# Patient Record
Sex: MALE | Race: WHITE | NOT HISPANIC OR LATINO | Employment: FULL TIME | URBAN - METROPOLITAN AREA
[De-identification: names, ages, dates, MRNs, and addresses within clinical notes are randomized per-mention and may not be internally consistent; named-entity substitution may affect disease eponyms.]

---

## 2019-01-01 ENCOUNTER — OFFICE VISIT (OUTPATIENT)
Dept: NEUROSURGERY | Facility: CLINIC | Age: 59
End: 2019-01-01

## 2019-01-01 ENCOUNTER — DOCUMENTATION (OUTPATIENT)
Dept: ONCOLOGY | Facility: HOSPITAL | Age: 59
End: 2019-01-01

## 2019-01-01 ENCOUNTER — APPOINTMENT (OUTPATIENT)
Dept: LAB | Facility: HOSPITAL | Age: 59
End: 2019-01-01

## 2019-01-01 ENCOUNTER — HOSPITAL ENCOUNTER (OUTPATIENT)
Dept: RADIATION ONCOLOGY | Facility: HOSPITAL | Age: 59
Discharge: HOME | End: 2019-08-08

## 2019-01-01 ENCOUNTER — READMISSION MANAGEMENT (OUTPATIENT)
Dept: CALL CENTER | Facility: HOSPITAL | Age: 59
End: 2019-01-01

## 2019-01-01 ENCOUNTER — TELEPHONE (OUTPATIENT)
Dept: ONCOLOGY | Facility: CLINIC | Age: 59
End: 2019-01-01

## 2019-01-01 ENCOUNTER — DOCUMENTATION (OUTPATIENT)
Dept: ONCOLOGY | Facility: CLINIC | Age: 59
End: 2019-01-01

## 2019-01-01 ENCOUNTER — HOSPITAL ENCOUNTER (OUTPATIENT)
Dept: RADIATION ONCOLOGY | Facility: HOSPITAL | Age: 59
Discharge: HOME | End: 2019-08-13

## 2019-01-01 ENCOUNTER — HOSPITAL ENCOUNTER (OUTPATIENT)
Dept: RADIATION ONCOLOGY | Facility: HOSPITAL | Age: 59
Discharge: HOME | End: 2019-08-14

## 2019-01-01 ENCOUNTER — HOSPITAL ENCOUNTER (OUTPATIENT)
Dept: ONCOLOGY | Facility: HOSPITAL | Age: 59
Discharge: HOME | End: 2019-07-29

## 2019-01-01 ENCOUNTER — APPOINTMENT (OUTPATIENT)
Dept: GENERAL RADIOLOGY | Facility: HOSPITAL | Age: 59
End: 2019-01-01

## 2019-01-01 ENCOUNTER — HOSPITAL ENCOUNTER (OUTPATIENT)
Dept: RADIATION ONCOLOGY | Facility: HOSPITAL | Age: 59
Setting detail: RADIATION/ONCOLOGY SERIES
End: 2019-01-01

## 2019-01-01 ENCOUNTER — HOSPITAL ENCOUNTER (OUTPATIENT)
Dept: INTERVENTIONAL RADIOLOGY/VASCULAR | Facility: HOSPITAL | Age: 59
Discharge: HOME | End: 2019-08-01
Admitting: RADIOLOGY

## 2019-01-01 ENCOUNTER — APPOINTMENT (OUTPATIENT)
Dept: ONCOLOGY | Facility: HOSPITAL | Age: 59
End: 2019-01-01

## 2019-01-01 ENCOUNTER — HOSPITAL ENCOUNTER (OUTPATIENT)
Dept: RADIATION ONCOLOGY | Facility: HOSPITAL | Age: 59
Discharge: HOME | End: 2019-08-12

## 2019-01-01 ENCOUNTER — HOSPITAL ENCOUNTER (OUTPATIENT)
Dept: RADIATION ONCOLOGY | Facility: HOSPITAL | Age: 59
Discharge: HOME | End: 2019-08-16

## 2019-01-01 ENCOUNTER — DOCUMENTATION (OUTPATIENT)
Dept: RADIATION ONCOLOGY | Facility: HOSPITAL | Age: 59
End: 2019-01-01

## 2019-01-01 ENCOUNTER — TELEPHONE (OUTPATIENT)
Dept: RADIATION ONCOLOGY | Facility: HOSPITAL | Age: 59
End: 2019-01-01

## 2019-01-01 ENCOUNTER — HOSPITAL ENCOUNTER (OUTPATIENT)
Dept: RADIATION ONCOLOGY | Facility: HOSPITAL | Age: 59
Discharge: HOME | End: 2019-08-02

## 2019-01-01 ENCOUNTER — HOSPITAL ENCOUNTER (OUTPATIENT)
Dept: RADIATION ONCOLOGY | Facility: HOSPITAL | Age: 59
Discharge: HOME | End: 2019-07-31

## 2019-01-01 ENCOUNTER — HOSPITAL ENCOUNTER (OUTPATIENT)
Dept: MRI IMAGING | Facility: HOSPITAL | Age: 59
Discharge: HOME | End: 2019-08-30
Admitting: RADIOLOGY

## 2019-01-01 ENCOUNTER — APPOINTMENT (OUTPATIENT)
Dept: PET IMAGING | Facility: HOSPITAL | Age: 59
End: 2019-01-01

## 2019-01-01 ENCOUNTER — APPOINTMENT (OUTPATIENT)
Dept: ONCOLOGY | Facility: CLINIC | Age: 59
End: 2019-01-01

## 2019-01-01 ENCOUNTER — OFFICE VISIT (OUTPATIENT)
Dept: ONCOLOGY | Facility: CLINIC | Age: 59
End: 2019-01-01

## 2019-01-01 ENCOUNTER — HOSPITAL ENCOUNTER (OUTPATIENT)
Dept: RADIATION ONCOLOGY | Facility: HOSPITAL | Age: 59
Discharge: HOME | End: 2019-08-15

## 2019-01-01 ENCOUNTER — HOSPITAL ENCOUNTER (OUTPATIENT)
Dept: RADIATION ONCOLOGY | Facility: HOSPITAL | Age: 59
Discharge: HOME | End: 2019-07-29

## 2019-01-01 ENCOUNTER — APPOINTMENT (OUTPATIENT)
Dept: MRI IMAGING | Facility: HOSPITAL | Age: 59
End: 2019-01-01

## 2019-01-01 ENCOUNTER — HOSPITAL ENCOUNTER (OUTPATIENT)
Dept: RADIATION ONCOLOGY | Facility: HOSPITAL | Age: 59
Discharge: HOME | End: 2019-08-19

## 2019-01-01 ENCOUNTER — ANESTHESIA EVENT (OUTPATIENT)
Dept: PERIOP | Facility: HOSPITAL | Age: 59
End: 2019-01-01

## 2019-01-01 ENCOUNTER — HOSPITAL ENCOUNTER (OUTPATIENT)
Dept: RADIATION ONCOLOGY | Facility: HOSPITAL | Age: 59
Discharge: HOME | End: 2019-07-19

## 2019-01-01 ENCOUNTER — HOSPITAL ENCOUNTER (OUTPATIENT)
Dept: RADIATION ONCOLOGY | Facility: HOSPITAL | Age: 59
Discharge: HOME | End: 2019-08-01

## 2019-01-01 ENCOUNTER — APPOINTMENT (OUTPATIENT)
Dept: CT IMAGING | Facility: HOSPITAL | Age: 59
End: 2019-01-01

## 2019-01-01 ENCOUNTER — HOSPITAL ENCOUNTER (OUTPATIENT)
Dept: RADIATION ONCOLOGY | Facility: HOSPITAL | Age: 59
Discharge: HOME | End: 2019-08-06

## 2019-01-01 ENCOUNTER — HOSPITAL ENCOUNTER (OUTPATIENT)
Dept: RADIATION ONCOLOGY | Facility: HOSPITAL | Age: 59
Discharge: HOME | End: 2019-08-07

## 2019-01-01 ENCOUNTER — ANESTHESIA (OUTPATIENT)
Dept: PERIOP | Facility: HOSPITAL | Age: 59
End: 2019-01-01

## 2019-01-01 ENCOUNTER — HOSPITAL ENCOUNTER (OUTPATIENT)
Dept: RADIATION ONCOLOGY | Facility: HOSPITAL | Age: 59
Discharge: HOME | End: 2019-08-20

## 2019-01-01 ENCOUNTER — HOSPITAL ENCOUNTER (OUTPATIENT)
Dept: RADIATION ONCOLOGY | Facility: HOSPITAL | Age: 59
Discharge: HOME | End: 2019-07-30

## 2019-01-01 ENCOUNTER — HOSPITAL ENCOUNTER (OUTPATIENT)
Dept: ONCOLOGY | Facility: HOSPITAL | Age: 59
Setting detail: INFUSION SERIES
Discharge: HOME | End: 2019-08-09

## 2019-01-01 ENCOUNTER — CONSULT (OUTPATIENT)
Dept: RADIATION ONCOLOGY | Facility: HOSPITAL | Age: 59
End: 2019-01-01

## 2019-01-01 ENCOUNTER — TELEPHONE (OUTPATIENT)
Dept: NEUROSURGERY | Facility: CLINIC | Age: 59
End: 2019-01-01

## 2019-01-01 ENCOUNTER — HOSPITAL ENCOUNTER (OUTPATIENT)
Dept: RADIATION ONCOLOGY | Facility: HOSPITAL | Age: 59
Discharge: HOME | End: 2019-08-09

## 2019-01-01 ENCOUNTER — HOSPITAL ENCOUNTER (INPATIENT)
Facility: HOSPITAL | Age: 59
LOS: 5 days | Discharge: HOME | End: 2019-06-28
Attending: INTERNAL MEDICINE | Admitting: INTERNAL MEDICINE

## 2019-01-01 ENCOUNTER — HOSPITAL ENCOUNTER (EMERGENCY)
Facility: HOSPITAL | Age: 59
Discharge: ANOTHER HOSPITAL | End: 2019-06-23
Admitting: EMERGENCY MEDICINE

## 2019-01-01 ENCOUNTER — HOSPITAL ENCOUNTER (OUTPATIENT)
Dept: PET IMAGING | Facility: HOSPITAL | Age: 59
End: 2019-01-01

## 2019-01-01 ENCOUNTER — CONSULT (OUTPATIENT)
Dept: ONCOLOGY | Facility: CLINIC | Age: 59
End: 2019-01-01

## 2019-01-01 ENCOUNTER — HOSPITAL ENCOUNTER (OUTPATIENT)
Dept: PET IMAGING | Facility: HOSPITAL | Age: 59
Discharge: HOME | End: 2019-07-16
Admitting: INTERNAL MEDICINE

## 2019-01-01 VITALS
HEART RATE: 60 BPM | SYSTOLIC BLOOD PRESSURE: 139 MMHG | HEIGHT: 69 IN | OXYGEN SATURATION: 98 % | TEMPERATURE: 97.8 F | WEIGHT: 143.5 LBS | DIASTOLIC BLOOD PRESSURE: 74 MMHG | RESPIRATION RATE: 16 BRPM | BODY MASS INDEX: 21.25 KG/M2

## 2019-01-01 VITALS
TEMPERATURE: 97.8 F | BODY MASS INDEX: 20.88 KG/M2 | SYSTOLIC BLOOD PRESSURE: 123 MMHG | HEART RATE: 98 BPM | WEIGHT: 141 LBS | HEIGHT: 69 IN | RESPIRATION RATE: 20 BRPM | DIASTOLIC BLOOD PRESSURE: 72 MMHG

## 2019-01-01 VITALS
RESPIRATION RATE: 18 BRPM | BODY MASS INDEX: 21.37 KG/M2 | HEIGHT: 68 IN | DIASTOLIC BLOOD PRESSURE: 78 MMHG | WEIGHT: 141 LBS | HEART RATE: 128 BPM | TEMPERATURE: 98.2 F | SYSTOLIC BLOOD PRESSURE: 119 MMHG

## 2019-01-01 VITALS
HEIGHT: 69 IN | WEIGHT: 141.8 LBS | OXYGEN SATURATION: 99 % | RESPIRATION RATE: 18 BRPM | BODY MASS INDEX: 21 KG/M2 | SYSTOLIC BLOOD PRESSURE: 131 MMHG | TEMPERATURE: 98.3 F | HEART RATE: 67 BPM | DIASTOLIC BLOOD PRESSURE: 79 MMHG

## 2019-01-01 VITALS
WEIGHT: 141.6 LBS | HEART RATE: 75 BPM | RESPIRATION RATE: 18 BRPM | HEIGHT: 69 IN | BODY MASS INDEX: 20.97 KG/M2 | DIASTOLIC BLOOD PRESSURE: 71 MMHG | TEMPERATURE: 98.1 F | SYSTOLIC BLOOD PRESSURE: 131 MMHG

## 2019-01-01 VITALS
HEART RATE: 72 BPM | SYSTOLIC BLOOD PRESSURE: 133 MMHG | DIASTOLIC BLOOD PRESSURE: 71 MMHG | HEIGHT: 69 IN | WEIGHT: 141 LBS | BODY MASS INDEX: 20.88 KG/M2

## 2019-01-01 VITALS
OXYGEN SATURATION: 91 % | WEIGHT: 154.76 LBS | TEMPERATURE: 99.4 F | BODY MASS INDEX: 22.85 KG/M2 | SYSTOLIC BLOOD PRESSURE: 145 MMHG | HEART RATE: 96 BPM | DIASTOLIC BLOOD PRESSURE: 70 MMHG | RESPIRATION RATE: 18 BRPM

## 2019-01-01 VITALS
HEIGHT: 68 IN | OXYGEN SATURATION: 92 % | SYSTOLIC BLOOD PRESSURE: 143 MMHG | RESPIRATION RATE: 17 BRPM | DIASTOLIC BLOOD PRESSURE: 70 MMHG | TEMPERATURE: 98.2 F | BODY MASS INDEX: 21.58 KG/M2 | WEIGHT: 142.42 LBS | HEART RATE: 97 BPM

## 2019-01-01 VITALS
HEIGHT: 69 IN | RESPIRATION RATE: 16 BRPM | BODY MASS INDEX: 22.4 KG/M2 | SYSTOLIC BLOOD PRESSURE: 151 MMHG | DIASTOLIC BLOOD PRESSURE: 84 MMHG | HEART RATE: 103 BPM | TEMPERATURE: 98 F | WEIGHT: 151.24 LBS | OXYGEN SATURATION: 97 %

## 2019-01-01 VITALS — HEART RATE: 108 BPM | SYSTOLIC BLOOD PRESSURE: 120 MMHG | DIASTOLIC BLOOD PRESSURE: 71 MMHG

## 2019-01-01 DIAGNOSIS — Z09 SURGERY FOLLOW-UP EXAMINATION: Primary | ICD-10-CM

## 2019-01-01 DIAGNOSIS — T45.1X5A CHEMOTHERAPY-INDUCED NAUSEA: ICD-10-CM

## 2019-01-01 DIAGNOSIS — R41.0 CONFUSION: ICD-10-CM

## 2019-01-01 DIAGNOSIS — C79.31 METASTATIC CANCER TO BRAIN (HCC): Primary | ICD-10-CM

## 2019-01-01 DIAGNOSIS — C34.91 METASTATIC PRIMARY LUNG CANCER, RIGHT (HCC): ICD-10-CM

## 2019-01-01 DIAGNOSIS — K63.89 COLONIC MASS: ICD-10-CM

## 2019-01-01 DIAGNOSIS — C34.81 MALIGNANT NEOPLASM OF OVERLAPPING SITES OF RIGHT LUNG (HCC): ICD-10-CM

## 2019-01-01 DIAGNOSIS — G93.89 BRAIN MASS: Primary | ICD-10-CM

## 2019-01-01 DIAGNOSIS — C34.91 METASTATIC PRIMARY LUNG CANCER, RIGHT (HCC): Primary | ICD-10-CM

## 2019-01-01 DIAGNOSIS — G93.89 BRAIN MASS: ICD-10-CM

## 2019-01-01 DIAGNOSIS — G93.5 NEOPLASM OF BRAIN CAUSING MASS EFFECT ON ADJACENT STRUCTURES (HCC): Primary | ICD-10-CM

## 2019-01-01 DIAGNOSIS — K63.89 MASS OF COLON: ICD-10-CM

## 2019-01-01 DIAGNOSIS — Z12.11 SCREENING FOR COLON CANCER: ICD-10-CM

## 2019-01-01 DIAGNOSIS — C78.01 METASTATIC SQUAMOUS CELL CARCINOMA TO LUNG, RIGHT (HCC): Primary | ICD-10-CM

## 2019-01-01 DIAGNOSIS — C79.31 METASTATIC CANCER TO BRAIN (HCC): ICD-10-CM

## 2019-01-01 DIAGNOSIS — R11.0 CHEMOTHERAPY-INDUCED NAUSEA: ICD-10-CM

## 2019-01-01 DIAGNOSIS — D49.6 NEOPLASM OF BRAIN CAUSING MASS EFFECT ON ADJACENT STRUCTURES (HCC): Primary | ICD-10-CM

## 2019-01-01 DIAGNOSIS — Z09 SURGERY FOLLOW-UP EXAMINATION: ICD-10-CM

## 2019-01-01 DIAGNOSIS — L30.9 DERMATITIS: ICD-10-CM

## 2019-01-01 DIAGNOSIS — B37.0 ORAL THRUSH: ICD-10-CM

## 2019-01-01 DIAGNOSIS — G89.3 CANCER ASSOCIATED PAIN: ICD-10-CM

## 2019-01-01 DIAGNOSIS — C34.90 PRIMARY MALIGNANT NEOPLASM OF LUNG METASTATIC TO OTHER SITE, UNSPECIFIED LATERALITY (HCC): ICD-10-CM

## 2019-01-01 LAB
ALBUMIN SERPL-MCNC: 2.4 G/DL (ref 3.5–4.8)
ALBUMIN SERPL-MCNC: 2.8 G/DL (ref 3.5–4.8)
ALBUMIN SERPL-MCNC: 3.1 G/DL (ref 3.5–4.8)
ALBUMIN SERPL-MCNC: 3.2 G/DL (ref 3.5–5.2)
ALBUMIN SERPL-MCNC: 3.7 G/DL (ref 3.5–5.2)
ALBUMIN SERPL-MCNC: 3.8 G/DL (ref 3.5–5.2)
ALBUMIN SERPL-MCNC: NORMAL G/DL (ref 3.5–5.2)
ALBUMIN/GLOB SERPL: 0.7 G/DL (ref 1–1.7)
ALBUMIN/GLOB SERPL: 0.8 G/DL (ref 1–1.7)
ALBUMIN/GLOB SERPL: 0.9 G/DL (ref 1–1.7)
ALBUMIN/GLOB SERPL: 1.1 G/DL
ALBUMIN/GLOB SERPL: 1.2 G/DL
ALBUMIN/GLOB SERPL: 1.4 G/DL
ALBUMIN/GLOB SERPL: NORMAL G/DL
ALP SERPL-CCNC: 62 U/L (ref 32–91)
ALP SERPL-CCNC: 69 U/L (ref 39–117)
ALP SERPL-CCNC: 79 U/L (ref 32–91)
ALP SERPL-CCNC: 83 U/L (ref 39–117)
ALP SERPL-CCNC: 89 U/L (ref 32–91)
ALP SERPL-CCNC: 96 U/L (ref 39–117)
ALP SERPL-CCNC: NORMAL U/L (ref 39–117)
ALT SERPL W P-5'-P-CCNC: 10 U/L (ref 17–63)
ALT SERPL W P-5'-P-CCNC: 13 U/L (ref 1–41)
ALT SERPL W P-5'-P-CCNC: 16 U/L (ref 17–63)
ALT SERPL W P-5'-P-CCNC: 18 U/L (ref 17–63)
ALT SERPL W P-5'-P-CCNC: 7 U/L (ref 1–41)
ALT SERPL W P-5'-P-CCNC: 9 U/L (ref 1–41)
ALT SERPL W P-5'-P-CCNC: NORMAL U/L (ref 1–41)
ANION GAP SERPL CALCULATED.3IONS-SCNC: 12.9 MMOL/L
ANION GAP SERPL CALCULATED.3IONS-SCNC: 16 MMOL/L (ref 10–20)
ANION GAP SERPL CALCULATED.3IONS-SCNC: 18.8 MMOL/L (ref 5–15)
ANION GAP SERPL CALCULATED.3IONS-SCNC: 19.3 MMOL/L (ref 5–15)
ANION GAP SERPL CALCULATED.3IONS-SCNC: 9.2 MMOL/L
ANION GAP SERPL CALCULATED.3IONS-SCNC: 9.5 MMOL/L
ANION GAP SERPL CALCULATED.3IONS-SCNC: 9.7 MMOL/L (ref 5–15)
ANION GAP SERPL CALCULATED.3IONS-SCNC: NORMAL MMOL/L
ANISOCYTOSIS BLD QL: ABNORMAL
APTT PPP: 17.9 SECONDS (ref 24–31)
APTT PPP: 26.1 SECONDS (ref 22.7–35.4)
APTT PPP: 28.7 SECONDS (ref 22.7–35.4)
AST SERPL-CCNC: 11 U/L (ref 1–40)
AST SERPL-CCNC: 12 U/L (ref 1–40)
AST SERPL-CCNC: 14 U/L (ref 15–41)
AST SERPL-CCNC: 14 U/L (ref 1–40)
AST SERPL-CCNC: 15 U/L (ref 15–41)
AST SERPL-CCNC: 17 U/L (ref 15–41)
AST SERPL-CCNC: NORMAL U/L (ref 1–40)
BACTERIA SPEC AEROBE CULT: NORMAL
BACTERIA SPEC AEROBE CULT: NORMAL
BACTERIA SPEC ANAEROBE CULT: NORMAL
BASOPHILS # BLD AUTO: 0 10*3/MM3 (ref 0–0.2)
BASOPHILS # BLD AUTO: 0.02 10*3/MM3 (ref 0–0.2)
BASOPHILS # BLD AUTO: 0.03 10*3/MM3 (ref 0–0.2)
BASOPHILS # BLD AUTO: 0.03 10*3/MM3 (ref 0–0.2)
BASOPHILS # BLD AUTO: 0.06 10*3/MM3 (ref 0–0.2)
BASOPHILS # BLD AUTO: 0.06 10*3/MM3 (ref 0–0.2)
BASOPHILS # BLD AUTO: 0.1 10*3/MM3 (ref 0–0.2)
BASOPHILS NFR BLD AUTO: 0 % (ref 0–1.5)
BASOPHILS NFR BLD AUTO: 0.2 % (ref 0–1.5)
BASOPHILS NFR BLD AUTO: 0.2 % (ref 0–1.5)
BASOPHILS NFR BLD AUTO: 0.3 % (ref 0–1.5)
BASOPHILS NFR BLD AUTO: 0.4 % (ref 0–1.5)
BASOPHILS NFR BLD AUTO: 0.4 % (ref 0–1.5)
BASOPHILS NFR BLD AUTO: 1.3 % (ref 0–1.5)
BILIRUB SERPL-MCNC: 0.2 MG/DL (ref 0.2–1.2)
BILIRUB SERPL-MCNC: 0.2 MG/DL (ref 0.2–1.2)
BILIRUB SERPL-MCNC: 0.3 MG/DL (ref 0.3–1.2)
BILIRUB SERPL-MCNC: 0.6 MG/DL (ref 0.3–1.2)
BILIRUB SERPL-MCNC: 0.8 MG/DL (ref 0.3–1.2)
BILIRUB SERPL-MCNC: <0.2 MG/DL (ref 0.2–1.2)
BILIRUB SERPL-MCNC: NORMAL MG/DL (ref 0.2–1.2)
BUN BLD-MCNC: 12 MG/DL (ref 6–20)
BUN BLD-MCNC: 14 MG/DL (ref 8–20)
BUN BLD-MCNC: 16 MG/DL (ref 8–20)
BUN BLD-MCNC: 6 MG/DL (ref 8–20)
BUN BLD-MCNC: 7 MG/DL (ref 6–20)
BUN BLD-MCNC: NORMAL MG/DL (ref 6–20)
BUN/CREAT SERPL: 10.6 (ref 7–25)
BUN/CREAT SERPL: 19.7 (ref 7–25)
BUN/CREAT SERPL: 23.3 (ref 6.2–20.3)
BUN/CREAT SERPL: 26.7 (ref 6.2–20.3)
BUN/CREAT SERPL: 8.6 (ref 6.2–20.3)
BUN/CREAT SERPL: NORMAL (ref 7–25)
CALCIUM SPEC-SCNC: 8.3 MG/DL (ref 8.6–10.5)
CALCIUM SPEC-SCNC: 8.3 MG/DL (ref 8.9–10.3)
CALCIUM SPEC-SCNC: 8.8 MG/DL (ref 8.6–10.5)
CALCIUM SPEC-SCNC: 9.1 MG/DL (ref 8.6–10.5)
CALCIUM SPEC-SCNC: 9.1 MG/DL (ref 8.9–10.3)
CALCIUM SPEC-SCNC: 9.3 MG/DL (ref 8.9–10.3)
CALCIUM SPEC-SCNC: 9.5 MG/DL (ref 8.6–10.5)
CALCIUM SPEC-SCNC: NORMAL MG/DL (ref 8.6–10.5)
CHLORIDE SERPL-SCNC: 101 MMOL/L (ref 98–107)
CHLORIDE SERPL-SCNC: 90 MMOL/L (ref 101–111)
CHLORIDE SERPL-SCNC: 93 MMOL/L (ref 101–111)
CHLORIDE SERPL-SCNC: 94 MMOL/L (ref 101–111)
CHLORIDE SERPL-SCNC: 95 MMOL/L (ref 98–107)
CHLORIDE SERPL-SCNC: 96 MMOL/L (ref 98–107)
CHLORIDE SERPL-SCNC: 96 MMOL/L (ref 98–107)
CHLORIDE SERPL-SCNC: NORMAL MMOL/L (ref 98–107)
CO2 SERPL-SCNC: 23 MMOL/L (ref 22–32)
CO2 SERPL-SCNC: 25 MMOL/L (ref 22–32)
CO2 SERPL-SCNC: 27.1 MMOL/L (ref 22–29)
CO2 SERPL-SCNC: 27.8 MMOL/L (ref 22–29)
CO2 SERPL-SCNC: 28 MMOL/L (ref 22–32)
CO2 SERPL-SCNC: 31.3 MMOL/L (ref 22–29)
CO2 SERPL-SCNC: 31.5 MMOL/L (ref 22–29)
CO2 SERPL-SCNC: NORMAL MMOL/L (ref 22–29)
CREAT BLD-MCNC: 0.6 MG/DL (ref 0.7–1.2)
CREAT BLD-MCNC: 0.6 MG/DL (ref 0.7–1.2)
CREAT BLD-MCNC: 0.61 MG/DL (ref 0.76–1.27)
CREAT BLD-MCNC: 0.66 MG/DL (ref 0.76–1.27)
CREAT BLD-MCNC: 0.7 MG/DL (ref 0.7–1.2)
CREAT BLD-MCNC: NORMAL MG/DL (ref 0.76–1.27)
CREAT BLDA-MCNC: 0.4 MG/DL (ref 0.6–1.3)
CYTO UR: NORMAL
DEPRECATED RDW RBC AUTO: 42.7 FL (ref 37–54)
DEPRECATED RDW RBC AUTO: 43.6 FL (ref 37–54)
DEPRECATED RDW RBC AUTO: 43.9 FL (ref 37–54)
DEPRECATED RDW RBC AUTO: 44.2 FL (ref 37–54)
DEPRECATED RDW RBC AUTO: 44.6 FL (ref 37–54)
DEPRECATED RDW RBC AUTO: 45.5 FL (ref 37–54)
DEPRECATED RDW RBC AUTO: 45.6 FL (ref 37–54)
DEPRECATED RDW RBC AUTO: 48.7 FL (ref 37–54)
DEPRECATED RDW RBC AUTO: 49.7 FL (ref 37–54)
DEPRECATED RDW RBC AUTO: 50.8 FL (ref 37–54)
DEPRECATED RDW RBC AUTO: NORMAL FL (ref 37–54)
EOSINOPHIL # BLD AUTO: 0 10*3/MM3 (ref 0–0.4)
EOSINOPHIL # BLD AUTO: 0.03 10*3/MM3 (ref 0–0.4)
EOSINOPHIL # BLD AUTO: 0.04 10*3/MM3 (ref 0–0.4)
EOSINOPHIL # BLD AUTO: 0.1 10*3/MM3 (ref 0–0.4)
EOSINOPHIL # BLD AUTO: 0.5 10*3/MM3 (ref 0–0.4)
EOSINOPHIL NFR BLD AUTO: 0 % (ref 0.3–6.2)
EOSINOPHIL NFR BLD AUTO: 0.2 % (ref 0.3–6.2)
EOSINOPHIL NFR BLD AUTO: 0.5 % (ref 0.3–6.2)
EOSINOPHIL NFR BLD AUTO: 0.7 % (ref 0.3–6.2)
EOSINOPHIL NFR BLD AUTO: 4.6 % (ref 0.3–6.2)
ERYTHROCYTE [DISTWIDTH] IN BLOOD BY AUTOMATED COUNT: 13.3 % (ref 12.3–15.4)
ERYTHROCYTE [DISTWIDTH] IN BLOOD BY AUTOMATED COUNT: 13.7 % (ref 12.3–15.4)
ERYTHROCYTE [DISTWIDTH] IN BLOOD BY AUTOMATED COUNT: 13.7 % (ref 12.3–15.4)
ERYTHROCYTE [DISTWIDTH] IN BLOOD BY AUTOMATED COUNT: 13.8 % (ref 12.3–15.4)
ERYTHROCYTE [DISTWIDTH] IN BLOOD BY AUTOMATED COUNT: 13.9 % (ref 12.3–15.4)
ERYTHROCYTE [DISTWIDTH] IN BLOOD BY AUTOMATED COUNT: 14.4 % (ref 12.3–15.4)
ERYTHROCYTE [DISTWIDTH] IN BLOOD BY AUTOMATED COUNT: 14.6 % (ref 12.3–15.4)
ERYTHROCYTE [DISTWIDTH] IN BLOOD BY AUTOMATED COUNT: 15.5 % (ref 12.3–15.4)
ERYTHROCYTE [DISTWIDTH] IN BLOOD BY AUTOMATED COUNT: 15.6 % (ref 12.3–15.4)
ERYTHROCYTE [DISTWIDTH] IN BLOOD BY AUTOMATED COUNT: 16.6 % (ref 12.3–15.4)
ERYTHROCYTE [DISTWIDTH] IN BLOOD BY AUTOMATED COUNT: NORMAL % (ref 12.3–15.4)
GFR SERPL CREATININE-BSD FRML MDRD: 115 ML/MIN/1.73
GFR SERPL CREATININE-BSD FRML MDRD: 124 ML/MIN/1.73
GFR SERPL CREATININE-BSD FRML MDRD: 135 ML/MIN/1.73
GFR SERPL CREATININE-BSD FRML MDRD: 138 ML/MIN/1.73
GFR SERPL CREATININE-BSD FRML MDRD: 138 ML/MIN/1.73
GFR SERPL CREATININE-BSD FRML MDRD: NORMAL ML/MIN/1.73
GLOBULIN UR ELPH-MCNC: 2.6 GM/DL
GLOBULIN UR ELPH-MCNC: 2.8 GM/DL
GLOBULIN UR ELPH-MCNC: 3 GM/DL (ref 2.5–3.8)
GLOBULIN UR ELPH-MCNC: 3.3 GM/DL (ref 2.5–3.8)
GLOBULIN UR ELPH-MCNC: 3.4 GM/DL
GLOBULIN UR ELPH-MCNC: 3.8 GM/DL (ref 2.5–3.8)
GLOBULIN UR ELPH-MCNC: NORMAL GM/DL
GLUCOSE BLD-MCNC: 123 MG/DL (ref 65–99)
GLUCOSE BLD-MCNC: 137 MG/DL (ref 65–99)
GLUCOSE BLD-MCNC: 150 MG/DL (ref 65–99)
GLUCOSE BLD-MCNC: 159 MG/DL (ref 65–99)
GLUCOSE BLD-MCNC: 170 MG/DL (ref 65–99)
GLUCOSE BLD-MCNC: 181 MG/DL (ref 65–99)
GLUCOSE BLD-MCNC: 327 MG/DL (ref 65–99)
GLUCOSE BLD-MCNC: NORMAL MG/DL (ref 65–99)
GLUCOSE BLDC GLUCOMTR-MCNC: 119 MG/DL (ref 70–130)
GLUCOSE BLDC GLUCOMTR-MCNC: 128 MG/DL (ref 70–130)
GLUCOSE BLDC GLUCOMTR-MCNC: 130 MG/DL (ref 70–130)
GLUCOSE BLDC GLUCOMTR-MCNC: 136 MG/DL (ref 70–130)
GLUCOSE BLDC GLUCOMTR-MCNC: 139 MG/DL (ref 70–130)
GLUCOSE BLDC GLUCOMTR-MCNC: 149 MG/DL (ref 70–130)
GLUCOSE BLDC GLUCOMTR-MCNC: 155 MG/DL (ref 70–130)
GLUCOSE BLDC GLUCOMTR-MCNC: 165 MG/DL (ref 70–130)
GLUCOSE BLDC GLUCOMTR-MCNC: 168 MG/DL (ref 70–130)
GLUCOSE BLDC GLUCOMTR-MCNC: 169 MG/DL (ref 70–105)
GLUCOSE BLDC GLUCOMTR-MCNC: 181 MG/DL (ref 70–130)
GLUCOSE BLDC GLUCOMTR-MCNC: 199 MG/DL (ref 70–130)
GLUCOSE BLDC GLUCOMTR-MCNC: 207 MG/DL (ref 70–130)
GLUCOSE BLDC GLUCOMTR-MCNC: 218 MG/DL (ref 70–130)
GLUCOSE BLDC GLUCOMTR-MCNC: 218 MG/DL (ref 70–130)
GLUCOSE BLDC GLUCOMTR-MCNC: 227 MG/DL (ref 70–130)
GLUCOSE BLDC GLUCOMTR-MCNC: 231 MG/DL (ref 70–130)
GLUCOSE BLDC GLUCOMTR-MCNC: 236 MG/DL (ref 70–130)
GLUCOSE BLDC GLUCOMTR-MCNC: 241 MG/DL (ref 70–130)
GLUCOSE BLDC GLUCOMTR-MCNC: 252 MG/DL (ref 70–130)
GLUCOSE BLDC GLUCOMTR-MCNC: 298 MG/DL (ref 70–130)
GLUCOSE BLDC GLUCOMTR-MCNC: 308 MG/DL (ref 70–130)
GLUCOSE BLDC GLUCOMTR-MCNC: 310 MG/DL (ref 70–130)
GLUCOSE BLDC GLUCOMTR-MCNC: 323 MG/DL (ref 70–130)
GRAM STN SPEC: NORMAL
GRAM STN SPEC: NORMAL
HCT VFR BLD AUTO: 37.8 % (ref 37.5–51)
HCT VFR BLD AUTO: 37.9 % (ref 37.5–51)
HCT VFR BLD AUTO: 38.1 % (ref 37.5–51)
HCT VFR BLD AUTO: 38.5 % (ref 37.5–51)
HCT VFR BLD AUTO: 39.4 % (ref 37.5–51)
HCT VFR BLD AUTO: 39.4 % (ref 37.5–51)
HCT VFR BLD AUTO: 41.7 % (ref 37.5–51)
HCT VFR BLD AUTO: 42.5 % (ref 37.5–51)
HCT VFR BLD AUTO: 43.1 % (ref 37.5–51)
HCT VFR BLD AUTO: 43.2 % (ref 37.5–51)
HCT VFR BLD AUTO: NORMAL % (ref 37.5–51)
HGB BLD-MCNC: 11.9 G/DL (ref 13–17.7)
HGB BLD-MCNC: 12 G/DL (ref 13–17.7)
HGB BLD-MCNC: 12.3 G/DL (ref 13–17.7)
HGB BLD-MCNC: 12.4 G/DL (ref 13–17.7)
HGB BLD-MCNC: 13.1 G/DL (ref 13–17.7)
HGB BLD-MCNC: 13.2 G/DL (ref 13–17.7)
HGB BLD-MCNC: 13.4 G/DL (ref 13–17.7)
HGB BLD-MCNC: 13.7 G/DL (ref 13–17.7)
HGB BLD-MCNC: 14.2 G/DL (ref 13–17.7)
HGB BLD-MCNC: 14.6 G/DL (ref 13–17.7)
HGB BLD-MCNC: NORMAL G/DL (ref 13–17.7)
IMM GRANULOCYTES # BLD AUTO: 0.05 10*3/MM3 (ref 0–0.05)
IMM GRANULOCYTES # BLD AUTO: 0.11 10*3/MM3 (ref 0–0.05)
IMM GRANULOCYTES # BLD AUTO: 0.27 10*3/MM3 (ref 0–0.05)
IMM GRANULOCYTES # BLD AUTO: 0.29 10*3/MM3 (ref 0–0.05)
IMM GRANULOCYTES NFR BLD AUTO: 0.4 % (ref 0–0.5)
IMM GRANULOCYTES NFR BLD AUTO: 0.8 % (ref 0–0.5)
IMM GRANULOCYTES NFR BLD AUTO: 2 % (ref 0–0.5)
IMM GRANULOCYTES NFR BLD AUTO: 2.4 % (ref 0–0.5)
INR PPP: 0.89 (ref 0.9–1.1)
INR PPP: 1.04 (ref 0.9–1.1)
INR PPP: 1.04 (ref 0.9–1.1)
LAB AP CASE REPORT: NORMAL
LAB AP CASE REPORT: NORMAL
LAB AP CLINICAL INFORMATION: NORMAL
LAB AP DIAGNOSIS COMMENT: NORMAL
LYMPHOCYTES # BLD AUTO: 0.38 10*3/MM3 (ref 0.7–3.1)
LYMPHOCYTES # BLD AUTO: 0.45 10*3/MM3 (ref 0.7–3.1)
LYMPHOCYTES # BLD AUTO: 0.63 10*3/MM3 (ref 0.7–3.1)
LYMPHOCYTES # BLD AUTO: 1.02 10*3/MM3 (ref 0.7–3.1)
LYMPHOCYTES # BLD AUTO: 1.05 10*3/MM3 (ref 0.7–3.1)
LYMPHOCYTES # BLD AUTO: 1.06 10*3/MM3 (ref 0.7–3.1)
LYMPHOCYTES # BLD AUTO: 1.5 10*3/MM3 (ref 0.7–3.1)
LYMPHOCYTES # BLD MANUAL: 0.52 10*3/MM3 (ref 0.7–3.1)
LYMPHOCYTES NFR BLD AUTO: 14.6 % (ref 19.6–45.3)
LYMPHOCYTES NFR BLD AUTO: 2.4 % (ref 19.6–45.3)
LYMPHOCYTES NFR BLD AUTO: 5.6 % (ref 19.6–45.3)
LYMPHOCYTES NFR BLD AUTO: 5.6 % (ref 19.6–45.3)
LYMPHOCYTES NFR BLD AUTO: 7.1 % (ref 19.6–45.3)
LYMPHOCYTES NFR BLD AUTO: 7.4 % (ref 19.6–45.3)
LYMPHOCYTES NFR BLD AUTO: 7.6 % (ref 19.6–45.3)
LYMPHOCYTES NFR BLD MANUAL: 5 % (ref 19.6–45.3)
Lab: NORMAL
MAGNESIUM SERPL-MCNC: 2.2 MG/DL (ref 1.6–2.6)
MCH RBC QN AUTO: 27.8 PG (ref 26.6–33)
MCH RBC QN AUTO: 27.9 PG (ref 26.6–33)
MCH RBC QN AUTO: 28 PG (ref 26.6–33)
MCH RBC QN AUTO: 28.2 PG (ref 26.6–33)
MCH RBC QN AUTO: 28.4 PG (ref 26.6–33)
MCH RBC QN AUTO: 28.8 PG (ref 26.6–33)
MCH RBC QN AUTO: 29 PG (ref 26.6–33)
MCH RBC QN AUTO: 29 PG (ref 26.6–33)
MCH RBC QN AUTO: 29.4 PG (ref 26.6–33)
MCH RBC QN AUTO: 29.5 PG (ref 26.6–33)
MCH RBC QN AUTO: NORMAL PG (ref 26.6–33)
MCHC RBC AUTO-ENTMCNC: 31.5 G/DL (ref 31.5–35.7)
MCHC RBC AUTO-ENTMCNC: 31.7 G/DL (ref 31.5–35.7)
MCHC RBC AUTO-ENTMCNC: 31.8 G/DL (ref 31.5–35.7)
MCHC RBC AUTO-ENTMCNC: 31.9 G/DL (ref 31.5–35.7)
MCHC RBC AUTO-ENTMCNC: 32.1 G/DL (ref 31.5–35.7)
MCHC RBC AUTO-ENTMCNC: 32.5 G/DL (ref 31.5–35.7)
MCHC RBC AUTO-ENTMCNC: 33.2 G/DL (ref 31.5–35.7)
MCHC RBC AUTO-ENTMCNC: 33.4 G/DL (ref 31.5–35.7)
MCHC RBC AUTO-ENTMCNC: 33.6 G/DL (ref 31.5–35.7)
MCHC RBC AUTO-ENTMCNC: 33.7 G/DL (ref 31.5–35.7)
MCHC RBC AUTO-ENTMCNC: NORMAL G/DL (ref 31.5–35.7)
MCV RBC AUTO: 86.2 FL (ref 79–97)
MCV RBC AUTO: 86.8 FL (ref 79–97)
MCV RBC AUTO: 86.9 FL (ref 79–97)
MCV RBC AUTO: 87.2 FL (ref 79–97)
MCV RBC AUTO: 87.6 FL (ref 79–97)
MCV RBC AUTO: 88 FL (ref 79–97)
MCV RBC AUTO: 88.3 FL (ref 79–97)
MCV RBC AUTO: 88.5 FL (ref 79–97)
MCV RBC AUTO: 89.8 FL (ref 79–97)
MCV RBC AUTO: 90.2 FL (ref 79–97)
MCV RBC AUTO: NORMAL FL (ref 79–97)
MONOCYTES # BLD AUTO: 0.14 10*3/MM3 (ref 0.1–0.9)
MONOCYTES # BLD AUTO: 0.38 10*3/MM3 (ref 0.1–0.9)
MONOCYTES # BLD AUTO: 0.5 10*3/MM3 (ref 0.1–0.9)
MONOCYTES # BLD AUTO: 0.7 10*3/MM3 (ref 0.1–0.9)
MONOCYTES # BLD AUTO: 0.8 10*3/MM3 (ref 0.1–0.9)
MONOCYTES # BLD AUTO: 0.95 10*3/MM3 (ref 0.1–0.9)
MONOCYTES # BLD AUTO: 1.29 10*3/MM3 (ref 0.1–0.9)
MONOCYTES NFR BLD AUTO: 1 % (ref 5–12)
MONOCYTES NFR BLD AUTO: 11.8 % (ref 5–12)
MONOCYTES NFR BLD AUTO: 2.4 % (ref 5–12)
MONOCYTES NFR BLD AUTO: 3.6 % (ref 5–12)
MONOCYTES NFR BLD AUTO: 6.2 % (ref 5–12)
MONOCYTES NFR BLD AUTO: 8 % (ref 5–12)
MONOCYTES NFR BLD AUTO: 8.7 % (ref 5–12)
MRSA SPEC QL CULT: NORMAL
MYELOCYTES NFR BLD MANUAL: 1 % (ref 0–0)
NEUTROPHILS # BLD AUTO: 12.17 10*3/MM3 (ref 1.7–7)
NEUTROPHILS # BLD AUTO: 12.2 10*3/MM3 (ref 1.7–7)
NEUTROPHILS # BLD AUTO: 12.36 10*3/MM3 (ref 1.7–7)
NEUTROPHILS # BLD AUTO: 15.28 10*3/MM3 (ref 1.7–7)
NEUTROPHILS # BLD AUTO: 6.58 10*3/MM3 (ref 1.7–7)
NEUTROPHILS # BLD AUTO: 7.5 10*3/MM3 (ref 1.7–7)
NEUTROPHILS # BLD AUTO: 9.57 10*3/MM3 (ref 1.7–7)
NEUTROPHILS # BLD AUTO: 9.7 10*3/MM3 (ref 1.7–7)
NEUTROPHILS NFR BLD AUTO: 71.5 % (ref 42.7–76)
NEUTROPHILS NFR BLD AUTO: 81.8 % (ref 42.7–76)
NEUTROPHILS NFR BLD AUTO: 81.9 % (ref 42.7–76)
NEUTROPHILS NFR BLD AUTO: 85.5 % (ref 42.7–76)
NEUTROPHILS NFR BLD AUTO: 88.6 % (ref 42.7–76)
NEUTROPHILS NFR BLD AUTO: 89.5 % (ref 42.7–76)
NEUTROPHILS NFR BLD AUTO: 94.8 % (ref 42.7–76)
NEUTROPHILS NFR BLD MANUAL: 86 % (ref 42.7–76)
NEUTS BAND NFR BLD MANUAL: 6 % (ref 0–5)
NRBC BLD AUTO-RTO: 0 /100 WBC (ref 0–0.2)
OTHER CELLS %: 1 % (ref 0–0)
PATH REPORT.ADDENDUM SPEC: NORMAL
PATH REPORT.FINAL DX SPEC: NORMAL
PATH REPORT.FINAL DX SPEC: NORMAL
PATH REPORT.GROSS SPEC: NORMAL
PATHOLOGY REVIEW: YES
PLAT MORPH BLD: NORMAL
PLATELET # BLD AUTO: 209 10*3/MM3 (ref 140–450)
PLATELET # BLD AUTO: 216 10*3/MM3 (ref 140–450)
PLATELET # BLD AUTO: 272 10*3/MM3 (ref 140–450)
PLATELET # BLD AUTO: 281 10*3/MM3 (ref 140–450)
PLATELET # BLD AUTO: 287 10*3/MM3 (ref 140–450)
PLATELET # BLD AUTO: 317 10*3/MM3 (ref 140–450)
PLATELET # BLD AUTO: 346 10*3/MM3 (ref 140–450)
PLATELET # BLD AUTO: 397 10*3/MM3 (ref 140–450)
PLATELET # BLD AUTO: 397 10*3/MM3 (ref 140–450)
PLATELET # BLD AUTO: 424 10*3/MM3 (ref 140–450)
PLATELET # BLD AUTO: NORMAL 10*3/MM3 (ref 140–450)
PMV BLD AUTO: 5.9 FL (ref 6–12)
PMV BLD AUTO: 6.2 FL (ref 6–12)
PMV BLD AUTO: 7.9 FL (ref 6–12)
PMV BLD AUTO: 8.2 FL (ref 6–12)
PMV BLD AUTO: 8.3 FL (ref 6–12)
PMV BLD AUTO: 8.4 FL (ref 6–12)
PMV BLD AUTO: 8.5 FL (ref 6–12)
PMV BLD AUTO: 8.5 FL (ref 6–12)
PMV BLD AUTO: 8.6 FL (ref 6–12)
PMV BLD AUTO: 8.8 FL (ref 6–12)
PMV BLD AUTO: NORMAL FL (ref 6–12)
POTASSIUM BLD-SCNC: 3.4 MMOL/L (ref 3.5–5.2)
POTASSIUM BLD-SCNC: 3.5 MMOL/L (ref 3.5–5.2)
POTASSIUM BLD-SCNC: 3.7 MMOL/L (ref 3.6–5.1)
POTASSIUM BLD-SCNC: 3.8 MMOL/L (ref 3.6–5.1)
POTASSIUM BLD-SCNC: 4 MMOL/L (ref 3.5–5.2)
POTASSIUM BLD-SCNC: 4.1 MMOL/L (ref 3.5–5.2)
POTASSIUM BLD-SCNC: 4.7 MMOL/L (ref 3.5–5.2)
POTASSIUM BLD-SCNC: 5.3 MMOL/L (ref 3.6–5.1)
POTASSIUM BLD-SCNC: NORMAL MMOL/L (ref 3.5–5.2)
PROMYELOCYTES NFR BLD MANUAL: 1 % (ref 0–0)
PROT SERPL-MCNC: 5.7 G/DL (ref 6.1–7.9)
PROT SERPL-MCNC: 5.8 G/DL (ref 6.1–7.9)
PROT SERPL-MCNC: 5.8 G/DL (ref 6–8.5)
PROT SERPL-MCNC: 6.6 G/DL (ref 6–8.5)
PROT SERPL-MCNC: 6.9 G/DL (ref 6.1–7.9)
PROT SERPL-MCNC: 7.1 G/DL (ref 6–8.5)
PROT SERPL-MCNC: NORMAL G/DL (ref 6–8.5)
PROTHROMBIN TIME: 13.3 SECONDS (ref 11.7–14.2)
PROTHROMBIN TIME: 13.3 SECONDS (ref 11.7–14.2)
PROTHROMBIN TIME: 9.4 SECONDS (ref 9.6–11.7)
RBC # BLD AUTO: 4.22 10*6/MM3 (ref 4.14–5.8)
RBC # BLD AUTO: 4.27 10*6/MM3 (ref 4.14–5.8)
RBC # BLD AUTO: 4.27 10*6/MM3 (ref 4.14–5.8)
RBC # BLD AUTO: 4.39 10*6/MM3 (ref 4.14–5.8)
RBC # BLD AUTO: 4.46 10*6/MM3 (ref 4.14–5.8)
RBC # BLD AUTO: 4.48 10*6/MM3 (ref 4.14–5.8)
RBC # BLD AUTO: 4.78 10*6/MM3 (ref 4.14–5.8)
RBC # BLD AUTO: 4.89 10*6/MM3 (ref 4.14–5.8)
RBC # BLD AUTO: 4.92 10*6/MM3 (ref 4.14–5.8)
RBC # BLD AUTO: 5.01 10*6/MM3 (ref 4.14–5.8)
RBC # BLD AUTO: NORMAL 10*6/MM3 (ref 4.14–5.8)
SCAN SLIDE: NORMAL
SODIUM BLD-SCNC: 131 MMOL/L (ref 136–144)
SODIUM BLD-SCNC: 132 MMOL/L (ref 136–144)
SODIUM BLD-SCNC: 135 MMOL/L (ref 136–144)
SODIUM BLD-SCNC: 136 MMOL/L (ref 136–145)
SODIUM BLD-SCNC: 136 MMOL/L (ref 136–145)
SODIUM BLD-SCNC: 137 MMOL/L (ref 136–145)
SODIUM BLD-SCNC: 138 MMOL/L (ref 136–145)
SODIUM BLD-SCNC: NORMAL MMOL/L (ref 136–145)
T4 FREE SERPL-MCNC: 1.44 NG/DL (ref 0.58–1.64)
TOXIC GRANULATION: ABNORMAL
TROPONIN I SERPL-MCNC: <0.03 NG/ML (ref 0–0.03)
TSH SERPL DL<=0.05 MIU/L-ACNC: 1.11 MIU/ML (ref 0.34–5.6)
WBC NRBC COR # BLD: 10.4 10*3/MM3 (ref 3.4–10.8)
WBC NRBC COR # BLD: 10.4 10*3/MM3 (ref 3.4–10.8)
WBC NRBC COR # BLD: 11.33 10*3/MM3 (ref 3.4–10.8)
WBC NRBC COR # BLD: 11.52 10*3/MM3 (ref 3.4–10.8)
WBC NRBC COR # BLD: 13.61 10*3/MM3 (ref 3.4–10.8)
WBC NRBC COR # BLD: 13.77 10*3/MM3 (ref 3.4–10.8)
WBC NRBC COR # BLD: 13.82 10*3/MM3 (ref 3.4–10.8)
WBC NRBC COR # BLD: 14.87 10*3/MM3 (ref 3.4–10.8)
WBC NRBC COR # BLD: 16.1 10*3/MM3 (ref 3.4–10.8)
WBC NRBC COR # BLD: 8.04 10*3/MM3 (ref 3.4–10.8)
WBC NRBC COR # BLD: NORMAL 10*3/MM3 (ref 3.4–10.8)

## 2019-01-01 PROCEDURE — 61510 CRNEC TREPH EXC BRN TUM STTL: CPT | Performed by: NEUROLOGICAL SURGERY

## 2019-01-01 PROCEDURE — 99233 SBSQ HOSP IP/OBS HIGH 50: CPT | Performed by: THORACIC SURGERY (CARDIOTHORACIC VASCULAR SURGERY)

## 2019-01-01 PROCEDURE — 25010000002 HYDROMORPHONE PER 4 MG: Performed by: PHYSICIAN ASSISTANT

## 2019-01-01 PROCEDURE — 70553 MRI BRAIN STEM W/O & W/DYE: CPT

## 2019-01-01 PROCEDURE — 99232 SBSQ HOSP IP/OBS MODERATE 35: CPT | Performed by: PHYSICIAN ASSISTANT

## 2019-01-01 PROCEDURE — 61510 CRNEC TREPH EXC BRN TUM STTL: CPT | Performed by: SPECIALIST/TECHNOLOGIST, OTHER

## 2019-01-01 PROCEDURE — 77334 RADIATION TREATMENT AID(S): CPT | Performed by: RADIOLOGY

## 2019-01-01 PROCEDURE — 99223 1ST HOSP IP/OBS HIGH 75: CPT | Performed by: THORACIC SURGERY (CARDIOTHORACIC VASCULAR SURGERY)

## 2019-01-01 PROCEDURE — 0 DIATRIZOATE MEGLUMINE & SODIUM PER 1 ML: Performed by: NEUROLOGICAL SURGERY

## 2019-01-01 PROCEDURE — 94799 UNLISTED PULMONARY SVC/PX: CPT

## 2019-01-01 PROCEDURE — 63710000001 INSULIN LISPRO (HUMAN) PER 5 UNITS: Performed by: INTERNAL MEDICINE

## 2019-01-01 PROCEDURE — 25010000002 PEMBROLIZUMAB 100 MG/4ML SOLUTION 4 ML VIAL: Performed by: INTERNAL MEDICINE

## 2019-01-01 PROCEDURE — 25010000002 HYDROCORTISONE SODIUM SUCCINATE 100 MG RECONSTITUTED SOLUTION: Performed by: NEUROLOGICAL SURGERY

## 2019-01-01 PROCEDURE — 00B00ZZ EXCISION OF BRAIN, OPEN APPROACH: ICD-10-PCS | Performed by: NEUROLOGICAL SURGERY

## 2019-01-01 PROCEDURE — 77387 GUIDANCE FOR RADJ TX DLVR: CPT | Performed by: RADIOLOGY

## 2019-01-01 PROCEDURE — 80053 COMPREHEN METABOLIC PANEL: CPT | Performed by: NURSE PRACTITIONER

## 2019-01-01 PROCEDURE — 88342 IMHCHEM/IMCYTCHM 1ST ANTB: CPT | Performed by: NEUROLOGICAL SURGERY

## 2019-01-01 PROCEDURE — 97530 THERAPEUTIC ACTIVITIES: CPT

## 2019-01-01 PROCEDURE — 99024 POSTOP FOLLOW-UP VISIT: CPT | Performed by: PHYSICIAN ASSISTANT

## 2019-01-01 PROCEDURE — 85025 COMPLETE CBC W/AUTO DIFF WBC: CPT | Performed by: INTERNAL MEDICINE

## 2019-01-01 PROCEDURE — 96415 CHEMO IV INFUSION ADDL HR: CPT | Performed by: INTERNAL MEDICINE

## 2019-01-01 PROCEDURE — 82565 ASSAY OF CREATININE: CPT

## 2019-01-01 PROCEDURE — 77336 RADIATION PHYSICS CONSULT: CPT | Performed by: RADIOLOGY

## 2019-01-01 PROCEDURE — 77295 3-D RADIOTHERAPY PLAN: CPT | Performed by: RADIOLOGY

## 2019-01-01 PROCEDURE — 25010000002 LORAZEPAM PER 2 MG: Performed by: NURSE PRACTITIONER

## 2019-01-01 PROCEDURE — C1713 ANCHOR/SCREW BN/BN,TIS/BN: HCPCS | Performed by: NEUROLOGICAL SURGERY

## 2019-01-01 PROCEDURE — 25810000003 SODIUM CHLORIDE 0.9 % WITH KCL 20 MEQ 20-0.9 MEQ/L-% SOLUTION: Performed by: NEUROLOGICAL SURGERY

## 2019-01-01 PROCEDURE — 85730 THROMBOPLASTIN TIME PARTIAL: CPT | Performed by: RADIOLOGY

## 2019-01-01 PROCEDURE — 25010000003 HYDROCORTISONE SOD SUCCINATE PF 250 MG RECONSTITUTED SOLUTION 1 EACH VIAL: Performed by: NEUROLOGICAL SURGERY

## 2019-01-01 PROCEDURE — 82962 GLUCOSE BLOOD TEST: CPT

## 2019-01-01 PROCEDURE — 25010000003 LEVETIRACETAM IN NACL 0.75% 1000 MG/100ML SOLUTION: Performed by: NURSE PRACTITIONER

## 2019-01-01 PROCEDURE — 87205 SMEAR GRAM STAIN: CPT | Performed by: NEUROLOGICAL SURGERY

## 2019-01-01 PROCEDURE — 36416 COLLJ CAPILLARY BLOOD SPEC: CPT | Performed by: INTERNAL MEDICINE

## 2019-01-01 PROCEDURE — 99232 SBSQ HOSP IP/OBS MODERATE 35: CPT | Performed by: INTERNAL MEDICINE

## 2019-01-01 PROCEDURE — 25010000002 DIPHENHYDRAMINE PER 50 MG: Performed by: INTERNAL MEDICINE

## 2019-01-01 PROCEDURE — 96374 THER/PROPH/DIAG INJ IV PUSH: CPT

## 2019-01-01 PROCEDURE — 97165 OT EVAL LOW COMPLEX 30 MIN: CPT

## 2019-01-01 PROCEDURE — 78815 PET IMAGE W/CT SKULL-THIGH: CPT

## 2019-01-01 PROCEDURE — 70450 CT HEAD/BRAIN W/O DYE: CPT

## 2019-01-01 PROCEDURE — 85025 COMPLETE CBC W/AUTO DIFF WBC: CPT | Performed by: NEUROLOGICAL SURGERY

## 2019-01-01 PROCEDURE — 25010000002 LORAZEPAM PER 2 MG: Performed by: INTERNAL MEDICINE

## 2019-01-01 PROCEDURE — 83735 ASSAY OF MAGNESIUM: CPT | Performed by: INTERNAL MEDICINE

## 2019-01-01 PROCEDURE — 8E09XBZ COMPUTER ASSISTED PROCEDURE OF HEAD AND NECK REGION: ICD-10-PCS | Performed by: NEUROLOGICAL SURGERY

## 2019-01-01 PROCEDURE — 25010000002 CARBOPLATIN PER 50 MG: Performed by: INTERNAL MEDICINE

## 2019-01-01 PROCEDURE — 84132 ASSAY OF SERUM POTASSIUM: CPT | Performed by: INTERNAL MEDICINE

## 2019-01-01 PROCEDURE — 25010000002 PHENYLEPHRINE PER 1 ML: Performed by: NURSE ANESTHETIST, CERTIFIED REGISTERED

## 2019-01-01 PROCEDURE — 77412 RADIATION TX DELIVERY LVL 3: CPT | Performed by: RADIOLOGY

## 2019-01-01 PROCEDURE — 74177 CT ABD & PELVIS W/CONTRAST: CPT

## 2019-01-01 PROCEDURE — 25010000002 LEVETIRACETAM IN NACL 0.82% 500 MG/100ML SOLUTION: Performed by: NEUROLOGICAL SURGERY

## 2019-01-01 PROCEDURE — 80053 COMPREHEN METABOLIC PANEL: CPT | Performed by: INTERNAL MEDICINE

## 2019-01-01 PROCEDURE — 77280 THER RAD SIMULAJ FIELD SMPL: CPT | Performed by: RADIOLOGY

## 2019-01-01 PROCEDURE — 96365 THER/PROPH/DIAG IV INF INIT: CPT

## 2019-01-01 PROCEDURE — 25010000002 FENTANYL CITRATE (PF) 100 MCG/2ML SOLUTION: Performed by: RADIOLOGY

## 2019-01-01 PROCEDURE — 85610 PROTHROMBIN TIME: CPT | Performed by: NEUROLOGICAL SURGERY

## 2019-01-01 PROCEDURE — 63710000001 DEXAMETHASONE PER 0.25 MG: Performed by: PHYSICIAN ASSISTANT

## 2019-01-01 PROCEDURE — 99024 POSTOP FOLLOW-UP VISIT: CPT | Performed by: NEUROLOGICAL SURGERY

## 2019-01-01 PROCEDURE — 93005 ELECTROCARDIOGRAM TRACING: CPT | Performed by: NURSE PRACTITIONER

## 2019-01-01 PROCEDURE — 71045 X-RAY EXAM CHEST 1 VIEW: CPT

## 2019-01-01 PROCEDURE — 25010000002 DEXAMETHASONE PER 1 MG: Performed by: ANESTHESIOLOGY

## 2019-01-01 PROCEDURE — 25010000002 ONDANSETRON PER 1 MG: Performed by: NURSE ANESTHETIST, CERTIFIED REGISTERED

## 2019-01-01 PROCEDURE — 61781 SCAN PROC CRANIAL INTRA: CPT | Performed by: NEUROLOGICAL SURGERY

## 2019-01-01 PROCEDURE — 25010000002 HYDRALAZINE PER 20 MG: Performed by: NURSE ANESTHETIST, CERTIFIED REGISTERED

## 2019-01-01 PROCEDURE — 25010000002 PACLITAXEL PER 30 MG: Performed by: INTERNAL MEDICINE

## 2019-01-01 PROCEDURE — 87070 CULTURE OTHR SPECIMN AEROBIC: CPT | Performed by: NEUROLOGICAL SURGERY

## 2019-01-01 PROCEDURE — 77001 FLUOROGUIDE FOR VEIN DEVICE: CPT

## 2019-01-01 PROCEDURE — 96413 CHEMO IV INFUSION 1 HR: CPT | Performed by: INTERNAL MEDICINE

## 2019-01-01 PROCEDURE — 25010000002 IOPAMIDOL 61 % SOLUTION: Performed by: INTERNAL MEDICINE

## 2019-01-01 PROCEDURE — 84443 ASSAY THYROID STIM HORMONE: CPT | Performed by: INTERNAL MEDICINE

## 2019-01-01 PROCEDURE — 71260 CT THORAX DX C+: CPT

## 2019-01-01 PROCEDURE — 25010000002 PEGFILGRASTIM 6 MG/0.6ML PREFILLED SYRINGE KIT: Performed by: INTERNAL MEDICINE

## 2019-01-01 PROCEDURE — 96361 HYDRATE IV INFUSION ADD-ON: CPT | Performed by: INTERNAL MEDICINE

## 2019-01-01 PROCEDURE — 96375 TX/PRO/DX INJ NEW DRUG ADDON: CPT

## 2019-01-01 PROCEDURE — 0 GADOBENATE DIMEGLUMINE 529 MG/ML SOLUTION: Performed by: INTERNAL MEDICINE

## 2019-01-01 PROCEDURE — 85027 COMPLETE CBC AUTOMATED: CPT | Performed by: INTERNAL MEDICINE

## 2019-01-01 PROCEDURE — 25010000002 DEXAMETHASONE SODIUM PHOSPHATE 20 MG/5ML SOLUTION: Performed by: INTERNAL MEDICINE

## 2019-01-01 PROCEDURE — 99245 OFF/OP CONSLTJ NEW/EST HI 55: CPT | Performed by: INTERNAL MEDICINE

## 2019-01-01 PROCEDURE — A9577 INJ MULTIHANCE: HCPCS | Performed by: INTERNAL MEDICINE

## 2019-01-01 PROCEDURE — C1788 PORT, INDWELLING, IMP: HCPCS

## 2019-01-01 PROCEDURE — 25010000003 POTASSIUM CHLORIDE 10 MEQ/100ML SOLUTION: Performed by: INTERNAL MEDICINE

## 2019-01-01 PROCEDURE — 70470 CT HEAD/BRAIN W/O & W/DYE: CPT

## 2019-01-01 PROCEDURE — 25010000003 CEFAZOLIN IN DEXTROSE 2-4 GM/100ML-% SOLUTION: Performed by: NEUROLOGICAL SURGERY

## 2019-01-01 PROCEDURE — 87040 BLOOD CULTURE FOR BACTERIA: CPT | Performed by: NURSE PRACTITIONER

## 2019-01-01 PROCEDURE — 85007 BL SMEAR W/DIFF WBC COUNT: CPT | Performed by: RADIOLOGY

## 2019-01-01 PROCEDURE — 25010000002 FENTANYL CITRATE (PF) 100 MCG/2ML SOLUTION: Performed by: ANESTHESIOLOGY

## 2019-01-01 PROCEDURE — 84439 ASSAY OF FREE THYROXINE: CPT | Performed by: INTERNAL MEDICINE

## 2019-01-01 PROCEDURE — 88307 TISSUE EXAM BY PATHOLOGIST: CPT | Performed by: NEUROLOGICAL SURGERY

## 2019-01-01 PROCEDURE — 88331 PATH CONSLTJ SURG 1 BLK 1SPC: CPT | Performed by: NEUROLOGICAL SURGERY

## 2019-01-01 PROCEDURE — 87081 CULTURE SCREEN ONLY: CPT | Performed by: INTERNAL MEDICINE

## 2019-01-01 PROCEDURE — 96377 APPLICATON ON-BODY INJECTOR: CPT | Performed by: INTERNAL MEDICINE

## 2019-01-01 PROCEDURE — 96375 TX/PRO/DX INJ NEW DRUG ADDON: CPT | Performed by: INTERNAL MEDICINE

## 2019-01-01 PROCEDURE — A9552 F18 FDG: HCPCS | Performed by: INTERNAL MEDICINE

## 2019-01-01 PROCEDURE — 25010000002 HYDROMORPHONE PER 4 MG: Performed by: NURSE ANESTHETIST, CERTIFIED REGISTERED

## 2019-01-01 PROCEDURE — 25010000002 DEXAMETHASONE SODIUM PHOSPHATE 10 MG/ML SOLUTION: Performed by: NURSE PRACTITIONER

## 2019-01-01 PROCEDURE — 99152 MOD SED SAME PHYS/QHP 5/>YRS: CPT

## 2019-01-01 PROCEDURE — 99215 OFFICE O/P EST HI 40 MIN: CPT | Performed by: INTERNAL MEDICINE

## 2019-01-01 PROCEDURE — A9576 INJ PROHANCE MULTIPACK: HCPCS | Performed by: RADIOLOGY

## 2019-01-01 PROCEDURE — 85025 COMPLETE CBC W/AUTO DIFF WBC: CPT | Performed by: PHYSICIAN ASSISTANT

## 2019-01-01 PROCEDURE — 99284 EMERGENCY DEPT VISIT MOD MDM: CPT

## 2019-01-01 PROCEDURE — 85610 PROTHROMBIN TIME: CPT | Performed by: RADIOLOGY

## 2019-01-01 PROCEDURE — 77300 RADIATION THERAPY DOSE PLAN: CPT | Performed by: RADIOLOGY

## 2019-01-01 PROCEDURE — 25010000002 MIDAZOLAM PER 1 MG: Performed by: RADIOLOGY

## 2019-01-01 PROCEDURE — 25010000002 ONDANSETRON PER 1 MG: Performed by: RADIOLOGY

## 2019-01-01 PROCEDURE — 25010000002 MIDAZOLAM PER 1 MG: Performed by: ANESTHESIOLOGY

## 2019-01-01 PROCEDURE — 25010000002 GADOTERIDOL PER 1 ML: Performed by: RADIOLOGY

## 2019-01-01 PROCEDURE — 25010000002 NEOSTIGMINE PER 0.5 MG: Performed by: NURSE ANESTHETIST, CERTIFIED REGISTERED

## 2019-01-01 PROCEDURE — 99255 IP/OBS CONSLTJ NEW/EST HI 80: CPT | Performed by: INTERNAL MEDICINE

## 2019-01-01 PROCEDURE — 96417 CHEMO IV INFUS EACH ADDL SEQ: CPT | Performed by: INTERNAL MEDICINE

## 2019-01-01 PROCEDURE — 25010000002 FENTANYL CITRATE (PF) 100 MCG/2ML SOLUTION: Performed by: NURSE ANESTHETIST, CERTIFIED REGISTERED

## 2019-01-01 PROCEDURE — 99153 MOD SED SAME PHYS/QHP EA: CPT

## 2019-01-01 PROCEDURE — 85025 COMPLETE CBC W/AUTO DIFF WBC: CPT | Performed by: NURSE PRACTITIONER

## 2019-01-01 PROCEDURE — C1894 INTRO/SHEATH, NON-LASER: HCPCS

## 2019-01-01 PROCEDURE — 25010000002 FOSAPREPITANT PER 1 MG: Performed by: INTERNAL MEDICINE

## 2019-01-01 PROCEDURE — 0 FLUDEOXYGLUCOSE F18 SOLUTION: Performed by: INTERNAL MEDICINE

## 2019-01-01 PROCEDURE — 94640 AIRWAY INHALATION TREATMENT: CPT

## 2019-01-01 PROCEDURE — 25010000002 PROPOFOL 10 MG/ML EMULSION: Performed by: ANESTHESIOLOGY

## 2019-01-01 PROCEDURE — 25010000002 PALONOSETRON 0.25 MG/5ML SOLUTION PREFILLED SYRINGE: Performed by: INTERNAL MEDICINE

## 2019-01-01 PROCEDURE — 84484 ASSAY OF TROPONIN QUANT: CPT | Performed by: NURSE PRACTITIONER

## 2019-01-01 PROCEDURE — 87075 CULTR BACTERIA EXCEPT BLOOD: CPT | Performed by: NEUROLOGICAL SURGERY

## 2019-01-01 PROCEDURE — 77470 SPECIAL RADIATION TREATMENT: CPT | Performed by: RADIOLOGY

## 2019-01-01 PROCEDURE — 99252 IP/OBS CONSLTJ NEW/EST SF 35: CPT | Performed by: RADIOLOGY

## 2019-01-01 PROCEDURE — 85025 COMPLETE CBC W/AUTO DIFF WBC: CPT | Performed by: RADIOLOGY

## 2019-01-01 PROCEDURE — 92522 EVALUATE SPEECH PRODUCTION: CPT

## 2019-01-01 PROCEDURE — 88341 IMHCHEM/IMCYTCHM EA ADD ANTB: CPT | Performed by: NEUROLOGICAL SURGERY

## 2019-01-01 PROCEDURE — 85730 THROMBOPLASTIN TIME PARTIAL: CPT | Performed by: NEUROLOGICAL SURGERY

## 2019-01-01 PROCEDURE — 80048 BASIC METABOLIC PNL TOTAL CA: CPT | Performed by: NEUROLOGICAL SURGERY

## 2019-01-01 PROCEDURE — 96367 TX/PROPH/DG ADDL SEQ IV INF: CPT | Performed by: INTERNAL MEDICINE

## 2019-01-01 PROCEDURE — 97162 PT EVAL MOD COMPLEX 30 MIN: CPT

## 2019-01-01 PROCEDURE — 36415 COLL VENOUS BLD VENIPUNCTURE: CPT | Performed by: INTERNAL MEDICINE

## 2019-01-01 PROCEDURE — G0463 HOSPITAL OUTPT CLINIC VISIT: HCPCS | Performed by: RADIOLOGY

## 2019-01-01 DEVICE — CRANIOFIX 2 TITANIUM CLAMP 16MM
Type: IMPLANTABLE DEVICE | Site: BRAIN | Status: FUNCTIONAL
Brand: CRANIOFIX2

## 2019-01-01 DEVICE — DURAGEN® PLUS DURAL REGENERATION MATRIX, 2 IN X 2 IN (5 CM X 5 CM)
Type: IMPLANTABLE DEVICE | Site: BRAIN | Status: FUNCTIONAL
Brand: DURAGEN® PLUS

## 2019-01-01 DEVICE — CRANIOFIX 2 TITANIUM CLAMP 11MM
Type: IMPLANTABLE DEVICE | Site: BRAIN | Status: FUNCTIONAL
Brand: CRANIOFIX2

## 2019-01-01 DEVICE — SSC BONE WAX
Type: IMPLANTABLE DEVICE | Site: BRAIN | Status: FUNCTIONAL
Brand: SSC BONE WAX

## 2019-01-01 RX ORDER — LABETALOL HYDROCHLORIDE 5 MG/ML
5 INJECTION, SOLUTION INTRAVENOUS
Status: DISCONTINUED | OUTPATIENT
Start: 2019-01-01 | End: 2019-01-01 | Stop reason: HOSPADM

## 2019-01-01 RX ORDER — FAMOTIDINE 20 MG/1
20 TABLET, FILM COATED ORAL EVERY 12 HOURS
Qty: 60 TABLET | Refills: 0 | Status: SHIPPED | OUTPATIENT
Start: 2019-01-01 | End: 2019-01-01 | Stop reason: ALTCHOICE

## 2019-01-01 RX ORDER — SODIUM CHLORIDE 0.9 % (FLUSH) 0.9 %
3 SYRINGE (ML) INJECTION EVERY 12 HOURS SCHEDULED
Status: DISCONTINUED | OUTPATIENT
Start: 2019-01-01 | End: 2019-01-01 | Stop reason: HOSPADM

## 2019-01-01 RX ORDER — DEXAMETHASONE SODIUM PHOSPHATE 10 MG/ML
10 INJECTION, SOLUTION INTRAMUSCULAR; INTRAVENOUS ONCE
Status: COMPLETED | OUTPATIENT
Start: 2019-01-01 | End: 2019-01-01

## 2019-01-01 RX ORDER — FAMOTIDINE 20 MG/1
20 TABLET, FILM COATED ORAL EVERY 12 HOURS
Status: DISCONTINUED | OUTPATIENT
Start: 2019-01-01 | End: 2019-01-01 | Stop reason: HOSPADM

## 2019-01-01 RX ORDER — SODIUM CHLORIDE 0.9 % (FLUSH) 0.9 %
10 SYRINGE (ML) INJECTION AS NEEDED
Status: CANCELLED | OUTPATIENT
Start: 2019-01-01

## 2019-01-01 RX ORDER — OXYCODONE AND ACETAMINOPHEN 7.5; 325 MG/1; MG/1
1 TABLET ORAL ONCE AS NEEDED
Status: DISCONTINUED | OUTPATIENT
Start: 2019-01-01 | End: 2019-01-01 | Stop reason: HOSPADM

## 2019-01-01 RX ORDER — DEXAMETHASONE SODIUM PHOSPHATE 10 MG/ML
INJECTION INTRAMUSCULAR; INTRAVENOUS AS NEEDED
Status: DISCONTINUED | OUTPATIENT
Start: 2019-01-01 | End: 2019-01-01 | Stop reason: SURG

## 2019-01-01 RX ORDER — LIDOCAINE HYDROCHLORIDE 20 MG/ML
INJECTION, SOLUTION INFILTRATION; PERINEURAL AS NEEDED
Status: DISCONTINUED | OUTPATIENT
Start: 2019-01-01 | End: 2019-01-01 | Stop reason: SURG

## 2019-01-01 RX ORDER — PROMETHAZINE HYDROCHLORIDE 25 MG/1
25 SUPPOSITORY RECTAL ONCE AS NEEDED
Status: DISCONTINUED | OUTPATIENT
Start: 2019-01-01 | End: 2019-01-01 | Stop reason: HOSPADM

## 2019-01-01 RX ORDER — SODIUM CHLORIDE 9 MG/ML
250 INJECTION, SOLUTION INTRAVENOUS ONCE
Status: CANCELLED | OUTPATIENT
Start: 2019-01-01

## 2019-01-01 RX ORDER — ACETAMINOPHEN 325 MG/1
650 TABLET ORAL EVERY 4 HOURS PRN
Status: DISCONTINUED | OUTPATIENT
Start: 2019-01-01 | End: 2019-01-01 | Stop reason: HOSPADM

## 2019-01-01 RX ORDER — ACETAMINOPHEN 650 MG/1
650 SUPPOSITORY RECTAL EVERY 4 HOURS PRN
Status: DISCONTINUED | OUTPATIENT
Start: 2019-01-01 | End: 2019-01-01

## 2019-01-01 RX ORDER — BENZONATATE 200 MG/1
200 CAPSULE ORAL 3 TIMES DAILY PRN
Qty: 90 CAPSULE | Refills: 1 | Status: SHIPPED | OUTPATIENT
Start: 2019-01-01

## 2019-01-01 RX ORDER — GUAIFENESIN/DEXTROMETHORPHAN 100-10MG/5
5 SYRUP ORAL EVERY 4 HOURS PRN
Qty: 1000 ML | Refills: 0 | Status: SHIPPED | OUTPATIENT
Start: 2019-01-01 | End: 2019-01-01 | Stop reason: SDUPTHER

## 2019-01-01 RX ORDER — FAMOTIDINE 10 MG/ML
20 INJECTION, SOLUTION INTRAVENOUS ONCE
Status: CANCELLED | OUTPATIENT
Start: 2019-01-01

## 2019-01-01 RX ORDER — HYDROCODONE BITARTRATE AND ACETAMINOPHEN 5; 325 MG/1; MG/1
1 TABLET ORAL EVERY 6 HOURS PRN
Qty: 180 TABLET | Refills: 0
Start: 2019-01-01 | End: 2019-01-01 | Stop reason: SDUPTHER

## 2019-01-01 RX ORDER — PROMETHAZINE HYDROCHLORIDE 25 MG/ML
12.5 INJECTION, SOLUTION INTRAMUSCULAR; INTRAVENOUS ONCE
Status: CANCELLED
Start: 2019-01-01 | End: 2019-01-01

## 2019-01-01 RX ORDER — SENNA AND DOCUSATE SODIUM 50; 8.6 MG/1; MG/1
2 TABLET, FILM COATED ORAL NIGHTLY
Status: DISCONTINUED | OUTPATIENT
Start: 2019-01-01 | End: 2019-01-01 | Stop reason: HOSPADM

## 2019-01-01 RX ORDER — LEVETIRACETAM 500 MG/1
500 TABLET ORAL EVERY 12 HOURS SCHEDULED
Qty: 60 TABLET | Refills: 0 | Status: SHIPPED | OUTPATIENT
Start: 2019-01-01

## 2019-01-01 RX ORDER — ONDANSETRON 2 MG/ML
4 INJECTION INTRAMUSCULAR; INTRAVENOUS EVERY 6 HOURS PRN
Status: DISCONTINUED | OUTPATIENT
Start: 2019-01-01 | End: 2019-01-01 | Stop reason: HOSPADM

## 2019-01-01 RX ORDER — LEVETIRACETAM 500 MG/1
500 TABLET ORAL EVERY 12 HOURS SCHEDULED
Qty: 60 TABLET | Refills: 0 | Status: SHIPPED | OUTPATIENT
Start: 2019-01-01 | End: 2019-01-01 | Stop reason: SDUPTHER

## 2019-01-01 RX ORDER — MORPHINE SULFATE 15 MG/1
15 TABLET, FILM COATED, EXTENDED RELEASE ORAL 2 TIMES DAILY
Qty: 60 TABLET | Refills: 0 | Status: SHIPPED | OUTPATIENT
Start: 2019-01-01 | End: 2019-01-01 | Stop reason: SDUPTHER

## 2019-01-01 RX ORDER — NICOTINE 21 MG/24HR
1 PATCH, TRANSDERMAL 24 HOURS TRANSDERMAL
Status: DISCONTINUED | OUTPATIENT
Start: 2019-01-01 | End: 2019-01-01 | Stop reason: HOSPADM

## 2019-01-01 RX ORDER — NICOTINE POLACRILEX 4 MG
15 LOZENGE BUCCAL
Status: DISCONTINUED | OUTPATIENT
Start: 2019-01-01 | End: 2019-01-01

## 2019-01-01 RX ORDER — DEXTROSE MONOHYDRATE 25 G/50ML
25 INJECTION, SOLUTION INTRAVENOUS
Status: DISCONTINUED | OUTPATIENT
Start: 2019-01-01 | End: 2019-01-01 | Stop reason: HOSPADM

## 2019-01-01 RX ORDER — BENZONATATE 200 MG/1
200 CAPSULE ORAL 3 TIMES DAILY PRN
Qty: 90 CAPSULE | Refills: 1 | Status: SHIPPED | OUTPATIENT
Start: 2019-01-01 | End: 2019-01-01 | Stop reason: SDUPTHER

## 2019-01-01 RX ORDER — FLUCONAZOLE 100 MG/1
200 TABLET ORAL DAILY
Qty: 5 TABLET | Refills: 0 | Status: SHIPPED | OUTPATIENT
Start: 2019-01-01 | End: 2019-01-01 | Stop reason: SDUPTHER

## 2019-01-01 RX ORDER — GUAIFENESIN/DEXTROMETHORPHAN 100-10MG/5
5 SYRUP ORAL EVERY 4 HOURS PRN
Status: DISCONTINUED | OUTPATIENT
Start: 2019-01-01 | End: 2019-01-01 | Stop reason: HOSPADM

## 2019-01-01 RX ORDER — ACETAMINOPHEN 325 MG/1
650 TABLET ORAL ONCE AS NEEDED
Status: DISCONTINUED | OUTPATIENT
Start: 2019-01-01 | End: 2019-01-01 | Stop reason: HOSPADM

## 2019-01-01 RX ORDER — FAMOTIDINE 20 MG/1
20 TABLET, FILM COATED ORAL EVERY 12 HOURS
Qty: 60 TABLET | Refills: 0 | Status: SHIPPED | OUTPATIENT
Start: 2019-01-01 | End: 2019-01-01 | Stop reason: SDUPTHER

## 2019-01-01 RX ORDER — GLYCOPYRROLATE 0.2 MG/ML
INJECTION INTRAMUSCULAR; INTRAVENOUS AS NEEDED
Status: DISCONTINUED | OUTPATIENT
Start: 2019-01-01 | End: 2019-01-01 | Stop reason: SURG

## 2019-01-01 RX ORDER — DEXAMETHASONE 4 MG/1
4 TABLET ORAL EVERY 12 HOURS SCHEDULED
Qty: 60 TABLET | Refills: 0
Start: 2019-01-01 | End: 2019-01-01 | Stop reason: SDUPTHER

## 2019-01-01 RX ORDER — DEXTROSE MONOHYDRATE 25 G/50ML
25 INJECTION, SOLUTION INTRAVENOUS
Status: DISCONTINUED | OUTPATIENT
Start: 2019-01-01 | End: 2019-01-01

## 2019-01-01 RX ORDER — IPRATROPIUM BROMIDE AND ALBUTEROL SULFATE 2.5; .5 MG/3ML; MG/3ML
3 SOLUTION RESPIRATORY (INHALATION)
Status: DISCONTINUED | OUTPATIENT
Start: 2019-01-01 | End: 2019-01-01 | Stop reason: HOSPADM

## 2019-01-01 RX ORDER — GUAIFENESIN/DEXTROMETHORPHAN 100-10MG/5
5 SYRUP ORAL EVERY 4 HOURS PRN
Qty: 1000 ML | Refills: 0 | Status: SHIPPED | OUTPATIENT
Start: 2019-01-01

## 2019-01-01 RX ORDER — POTASSIUM CHLORIDE 750 MG/1
40 CAPSULE, EXTENDED RELEASE ORAL AS NEEDED
Status: DISCONTINUED | OUTPATIENT
Start: 2019-01-01 | End: 2019-01-01 | Stop reason: HOSPADM

## 2019-01-01 RX ORDER — LORAZEPAM 0.5 MG/1
0.5 TABLET ORAL EVERY 8 HOURS PRN
Qty: 90 TABLET | Refills: 0 | Status: SHIPPED | OUTPATIENT
Start: 2019-01-01 | End: 2019-01-01

## 2019-01-01 RX ORDER — HYDRALAZINE HYDROCHLORIDE 20 MG/ML
INJECTION INTRAMUSCULAR; INTRAVENOUS AS NEEDED
Status: DISCONTINUED | OUTPATIENT
Start: 2019-01-01 | End: 2019-01-01 | Stop reason: SURG

## 2019-01-01 RX ORDER — ONDANSETRON HYDROCHLORIDE 8 MG/1
8 TABLET, FILM COATED ORAL EVERY 8 HOURS PRN
Qty: 30 TABLET | Refills: 2 | Status: SHIPPED | OUTPATIENT
Start: 2019-01-01

## 2019-01-01 RX ORDER — SODIUM CHLORIDE 9 MG/ML
250 INJECTION, SOLUTION INTRAVENOUS ONCE
Status: COMPLETED | OUTPATIENT
Start: 2019-01-01 | End: 2019-01-01

## 2019-01-01 RX ORDER — ONDANSETRON 2 MG/ML
4 INJECTION INTRAMUSCULAR; INTRAVENOUS ONCE AS NEEDED
Status: DISCONTINUED | OUTPATIENT
Start: 2019-01-01 | End: 2019-01-01 | Stop reason: HOSPADM

## 2019-01-01 RX ORDER — PALONOSETRON HYDROCHLORIDE 0.05 MG/ML
0.25 INJECTION, SOLUTION INTRAVENOUS ONCE
Status: COMPLETED | OUTPATIENT
Start: 2019-01-01 | End: 2019-01-01

## 2019-01-01 RX ORDER — ROCURONIUM BROMIDE 10 MG/ML
INJECTION, SOLUTION INTRAVENOUS AS NEEDED
Status: DISCONTINUED | OUTPATIENT
Start: 2019-01-01 | End: 2019-01-01 | Stop reason: SURG

## 2019-01-01 RX ORDER — DEXAMETHASONE 4 MG/1
4 TABLET ORAL EVERY 12 HOURS SCHEDULED
Status: DISCONTINUED | OUTPATIENT
Start: 2019-01-01 | End: 2019-01-01 | Stop reason: HOSPADM

## 2019-01-01 RX ORDER — CLOTRIMAZOLE 10 MG/1
10 LOZENGE ORAL; TOPICAL 3 TIMES DAILY
Qty: 45 TABLET | Refills: 3 | Status: SHIPPED | OUTPATIENT
Start: 2019-01-01

## 2019-01-01 RX ORDER — LIDOCAINE AND PRILOCAINE 25; 25 MG/G; MG/G
CREAM TOPICAL
Qty: 30 G | Refills: 1 | Status: SHIPPED | OUTPATIENT
Start: 2019-01-01

## 2019-01-01 RX ORDER — ONDANSETRON 4 MG/1
4 TABLET, FILM COATED ORAL EVERY 6 HOURS PRN
Status: DISCONTINUED | OUTPATIENT
Start: 2019-01-01 | End: 2019-01-01 | Stop reason: HOSPADM

## 2019-01-01 RX ORDER — LEVETIRACETAM 500 MG/1
500 TABLET ORAL EVERY 12 HOURS SCHEDULED
Status: DISCONTINUED | OUTPATIENT
Start: 2019-01-01 | End: 2019-01-01 | Stop reason: HOSPADM

## 2019-01-01 RX ORDER — CEFAZOLIN SODIUM 2 G/100ML
2 INJECTION, SOLUTION INTRAVENOUS ONCE
Status: COMPLETED | OUTPATIENT
Start: 2019-01-01 | End: 2019-01-01

## 2019-01-01 RX ORDER — SODIUM CHLORIDE 0.9 % (FLUSH) 0.9 %
3-10 SYRINGE (ML) INJECTION AS NEEDED
Status: DISCONTINUED | OUTPATIENT
Start: 2019-01-01 | End: 2019-01-01 | Stop reason: HOSPADM

## 2019-01-01 RX ORDER — CEFAZOLIN SODIUM 2 G/100ML
2 INJECTION, SOLUTION INTRAVENOUS EVERY 8 HOURS
Status: COMPLETED | OUTPATIENT
Start: 2019-01-01 | End: 2019-01-01

## 2019-01-01 RX ORDER — FAMOTIDINE 10 MG/ML
20 INJECTION, SOLUTION INTRAVENOUS AS NEEDED
Status: CANCELLED | OUTPATIENT
Start: 2019-01-01

## 2019-01-01 RX ORDER — FAMOTIDINE 10 MG/ML
20 INJECTION, SOLUTION INTRAVENOUS ONCE
Status: DISCONTINUED | OUTPATIENT
Start: 2019-01-01 | End: 2019-01-01 | Stop reason: HOSPADM

## 2019-01-01 RX ORDER — LIDOCAINE HYDROCHLORIDE 40 MG/ML
SOLUTION TOPICAL AS NEEDED
Status: DISCONTINUED | OUTPATIENT
Start: 2019-01-01 | End: 2019-01-01 | Stop reason: SURG

## 2019-01-01 RX ORDER — HEPARIN SODIUM (PORCINE) LOCK FLUSH IV SOLN 100 UNIT/ML 100 UNIT/ML
500 SOLUTION INTRAVENOUS AS NEEDED
Status: CANCELLED | OUTPATIENT
Start: 2019-01-01

## 2019-01-01 RX ORDER — EPHEDRINE SULFATE 50 MG/ML
INJECTION, SOLUTION INTRAVENOUS AS NEEDED
Status: DISCONTINUED | OUTPATIENT
Start: 2019-01-01 | End: 2019-01-01 | Stop reason: SURG

## 2019-01-01 RX ORDER — MAGNESIUM HYDROXIDE 1200 MG/15ML
LIQUID ORAL AS NEEDED
Status: DISCONTINUED | OUTPATIENT
Start: 2019-01-01 | End: 2019-01-01 | Stop reason: HOSPADM

## 2019-01-01 RX ORDER — DEXAMETHASONE 4 MG/1
4 TABLET ORAL EVERY 12 HOURS SCHEDULED
Qty: 60 TABLET | Refills: 0 | Status: SHIPPED | OUTPATIENT
Start: 2019-01-01 | End: 2019-01-01 | Stop reason: SDUPTHER

## 2019-01-01 RX ORDER — NICOTINE 21 MG/24HR
1 PATCH, TRANSDERMAL 24 HOURS TRANSDERMAL
Qty: 21 PATCH | Refills: 0 | Status: SHIPPED | OUTPATIENT
Start: 2019-01-01

## 2019-01-01 RX ORDER — PROMETHAZINE HYDROCHLORIDE 12.5 MG/1
12.5 TABLET ORAL EVERY 6 HOURS PRN
Qty: 30 TABLET | Refills: 2 | Status: SHIPPED | OUTPATIENT
Start: 2019-01-01

## 2019-01-01 RX ORDER — ONDANSETRON 2 MG/ML
INJECTION INTRAMUSCULAR; INTRAVENOUS AS NEEDED
Status: DISCONTINUED | OUTPATIENT
Start: 2019-01-01 | End: 2019-01-01 | Stop reason: SURG

## 2019-01-01 RX ORDER — SODIUM CHLORIDE, SODIUM LACTATE, POTASSIUM CHLORIDE, CALCIUM CHLORIDE 600; 310; 30; 20 MG/100ML; MG/100ML; MG/100ML; MG/100ML
9 INJECTION, SOLUTION INTRAVENOUS CONTINUOUS
Status: DISCONTINUED | OUTPATIENT
Start: 2019-01-01 | End: 2019-01-01

## 2019-01-01 RX ORDER — SENNA AND DOCUSATE SODIUM 50; 8.6 MG/1; MG/1
2 TABLET, FILM COATED ORAL NIGHTLY
Qty: 60 TABLET | Refills: 0 | Status: SHIPPED | OUTPATIENT
Start: 2019-01-01 | End: 2019-01-01 | Stop reason: SDUPTHER

## 2019-01-01 RX ORDER — LEVETIRACETAM 10 MG/ML
1000 INJECTION INTRAVASCULAR ONCE
Status: COMPLETED | OUTPATIENT
Start: 2019-01-01 | End: 2019-01-01

## 2019-01-01 RX ORDER — NALOXONE HCL 0.4 MG/ML
0.2 VIAL (ML) INJECTION AS NEEDED
Status: DISCONTINUED | OUTPATIENT
Start: 2019-01-01 | End: 2019-01-01 | Stop reason: HOSPADM

## 2019-01-01 RX ORDER — SODIUM CHLORIDE 0.9 % (FLUSH) 0.9 %
10 SYRINGE (ML) INJECTION AS NEEDED
Status: DISCONTINUED | OUTPATIENT
Start: 2019-01-01 | End: 2019-01-01 | Stop reason: HOSPADM

## 2019-01-01 RX ORDER — DEXAMETHASONE 4 MG/1
TABLET ORAL
Qty: 60 TABLET | Refills: 0 | Status: SHIPPED | OUTPATIENT
Start: 2019-01-01

## 2019-01-01 RX ORDER — BENZONATATE 100 MG/1
200 CAPSULE ORAL 3 TIMES DAILY PRN
Status: DISCONTINUED | OUTPATIENT
Start: 2019-01-01 | End: 2019-01-01 | Stop reason: HOSPADM

## 2019-01-01 RX ORDER — HYDROCODONE BITARTRATE AND ACETAMINOPHEN 5; 325 MG/1; MG/1
1 TABLET ORAL EVERY 4 HOURS PRN
Qty: 180 TABLET | Refills: 0 | Status: SHIPPED | OUTPATIENT
Start: 2019-01-01 | End: 2019-01-01 | Stop reason: SDUPTHER

## 2019-01-01 RX ORDER — RANITIDINE 150 MG/1
150 TABLET ORAL DAILY
COMMUNITY

## 2019-01-01 RX ORDER — ONDANSETRON 2 MG/ML
INJECTION INTRAMUSCULAR; INTRAVENOUS
Status: COMPLETED | OUTPATIENT
Start: 2019-01-01 | End: 2019-01-01

## 2019-01-01 RX ORDER — DEXAMETHASONE 4 MG/1
4 TABLET ORAL EVERY 12 HOURS SCHEDULED
Qty: 60 TABLET | Refills: 0
Start: 2019-01-01

## 2019-01-01 RX ORDER — SENNA AND DOCUSATE SODIUM 50; 8.6 MG/1; MG/1
2 TABLET, FILM COATED ORAL NIGHTLY
Qty: 60 TABLET | Refills: 0 | Status: SHIPPED | OUTPATIENT
Start: 2019-01-01

## 2019-01-01 RX ORDER — FLUCONAZOLE 100 MG/1
200 TABLET ORAL DAILY
Qty: 5 TABLET | Refills: 0 | Status: SHIPPED | OUTPATIENT
Start: 2019-01-01

## 2019-01-01 RX ORDER — MIDAZOLAM HYDROCHLORIDE 1 MG/ML
INJECTION INTRAMUSCULAR; INTRAVENOUS
Status: COMPLETED | OUTPATIENT
Start: 2019-01-01 | End: 2019-01-01

## 2019-01-01 RX ORDER — OLANZAPINE 10 MG/1
10 TABLET ORAL DAILY
Qty: 6 TABLET | Refills: 3 | Status: SHIPPED | OUTPATIENT
Start: 2019-01-01 | End: 2019-01-01

## 2019-01-01 RX ORDER — HYDROMORPHONE HYDROCHLORIDE 1 MG/ML
0.5 INJECTION, SOLUTION INTRAMUSCULAR; INTRAVENOUS; SUBCUTANEOUS EVERY 4 HOURS PRN
Status: DISCONTINUED | OUTPATIENT
Start: 2019-01-01 | End: 2019-01-01 | Stop reason: HOSPADM

## 2019-01-01 RX ORDER — LORAZEPAM 2 MG/ML
1 INJECTION INTRAMUSCULAR ONCE
Status: COMPLETED | OUTPATIENT
Start: 2019-01-01 | End: 2019-01-01

## 2019-01-01 RX ORDER — SODIUM CHLORIDE, SODIUM ACETATE ANHYDROUS, SODIUM GLUCONATE, POTASSIUM CHLORIDE, AND MAGNESIUM CHLORIDE 526; 222; 502; 37; 30 MG/100ML; MG/100ML; MG/100ML; MG/100ML; MG/100ML
IRRIGANT IRRIGATION AS NEEDED
Status: DISCONTINUED | OUTPATIENT
Start: 2019-01-01 | End: 2019-01-01 | Stop reason: HOSPADM

## 2019-01-01 RX ORDER — PALONOSETRON 0.05 MG/ML
0.25 INJECTION, SOLUTION INTRAVENOUS ONCE
Status: CANCELLED | OUTPATIENT
Start: 2019-01-01

## 2019-01-01 RX ORDER — LORAZEPAM 2 MG/ML
1 INJECTION INTRAMUSCULAR EVERY 4 HOURS PRN
Status: DISCONTINUED | OUTPATIENT
Start: 2019-01-01 | End: 2019-01-01 | Stop reason: HOSPADM

## 2019-01-01 RX ORDER — SODIUM CHLORIDE 0.9 % (FLUSH) 0.9 %
1-10 SYRINGE (ML) INJECTION AS NEEDED
Status: DISCONTINUED | OUTPATIENT
Start: 2019-01-01 | End: 2019-01-01 | Stop reason: HOSPADM

## 2019-01-01 RX ORDER — HYDROCODONE BITARTRATE AND ACETAMINOPHEN 7.5; 325 MG/1; MG/1
1 TABLET ORAL ONCE AS NEEDED
Status: DISCONTINUED | OUTPATIENT
Start: 2019-01-01 | End: 2019-01-01 | Stop reason: HOSPADM

## 2019-01-01 RX ORDER — PROMETHAZINE HYDROCHLORIDE 25 MG/1
25 TABLET ORAL ONCE AS NEEDED
Status: DISCONTINUED | OUTPATIENT
Start: 2019-01-01 | End: 2019-01-01 | Stop reason: HOSPADM

## 2019-01-01 RX ORDER — POTASSIUM CHLORIDE 1.5 G/1.77G
40 POWDER, FOR SOLUTION ORAL AS NEEDED
Status: DISCONTINUED | OUTPATIENT
Start: 2019-01-01 | End: 2019-01-01 | Stop reason: HOSPADM

## 2019-01-01 RX ORDER — DIPHENHYDRAMINE HYDROCHLORIDE 50 MG/ML
50 INJECTION INTRAMUSCULAR; INTRAVENOUS AS NEEDED
Status: CANCELLED | OUTPATIENT
Start: 2019-01-01

## 2019-01-01 RX ORDER — MIDAZOLAM HYDROCHLORIDE 1 MG/ML
2 INJECTION INTRAMUSCULAR; INTRAVENOUS
Status: DISCONTINUED | OUTPATIENT
Start: 2019-01-01 | End: 2019-01-01 | Stop reason: HOSPADM

## 2019-01-01 RX ORDER — NICOTINE POLACRILEX 4 MG
15 LOZENGE BUCCAL
Status: DISCONTINUED | OUTPATIENT
Start: 2019-01-01 | End: 2019-01-01 | Stop reason: HOSPADM

## 2019-01-01 RX ORDER — SODIUM CHLORIDE 9 MG/ML
50 INJECTION, SOLUTION INTRAVENOUS CONTINUOUS
Status: ACTIVE | OUTPATIENT
Start: 2019-01-01 | End: 2019-01-01

## 2019-01-01 RX ORDER — LORAZEPAM 2 MG/ML
0.5 INJECTION INTRAMUSCULAR ONCE
Status: CANCELLED
Start: 2019-01-01 | End: 2019-01-01

## 2019-01-01 RX ORDER — SODIUM CHLORIDE AND POTASSIUM CHLORIDE 150; 900 MG/100ML; MG/100ML
100 INJECTION, SOLUTION INTRAVENOUS CONTINUOUS
Status: DISCONTINUED | OUTPATIENT
Start: 2019-01-01 | End: 2019-01-01

## 2019-01-01 RX ORDER — LORAZEPAM 2 MG/ML
0.5 INJECTION INTRAMUSCULAR ONCE
Status: COMPLETED | OUTPATIENT
Start: 2019-01-01 | End: 2019-01-01

## 2019-01-01 RX ORDER — EPHEDRINE SULFATE 50 MG/ML
5 INJECTION, SOLUTION INTRAVENOUS ONCE AS NEEDED
Status: DISCONTINUED | OUTPATIENT
Start: 2019-01-01 | End: 2019-01-01 | Stop reason: HOSPADM

## 2019-01-01 RX ORDER — HYDROCODONE BITARTRATE AND ACETAMINOPHEN 5; 325 MG/1; MG/1
1 TABLET ORAL EVERY 4 HOURS PRN
Qty: 180 TABLET | Refills: 0 | Status: SHIPPED | OUTPATIENT
Start: 2019-01-01 | End: 2019-01-01

## 2019-01-01 RX ORDER — HYDROMORPHONE HYDROCHLORIDE 1 MG/ML
0.5 INJECTION, SOLUTION INTRAMUSCULAR; INTRAVENOUS; SUBCUTANEOUS
Status: DISCONTINUED | OUTPATIENT
Start: 2019-01-01 | End: 2019-01-01 | Stop reason: HOSPADM

## 2019-01-01 RX ORDER — POTASSIUM CHLORIDE 7.45 MG/ML
10 INJECTION INTRAVENOUS
Status: DISCONTINUED | OUTPATIENT
Start: 2019-01-01 | End: 2019-01-01 | Stop reason: HOSPADM

## 2019-01-01 RX ORDER — NICOTINE 21 MG/24HR
1 PATCH, TRANSDERMAL 24 HOURS TRANSDERMAL
Qty: 21 PATCH | Refills: 0 | Status: SHIPPED | OUTPATIENT
Start: 2019-01-01 | End: 2019-01-01 | Stop reason: SDUPTHER

## 2019-01-01 RX ORDER — PROMETHAZINE HYDROCHLORIDE 25 MG/ML
6.25 INJECTION, SOLUTION INTRAMUSCULAR; INTRAVENOUS
Status: DISCONTINUED | OUTPATIENT
Start: 2019-01-01 | End: 2019-01-01 | Stop reason: HOSPADM

## 2019-01-01 RX ORDER — DEXAMETHASONE 4 MG/1
4 TABLET ORAL EVERY 8 HOURS SCHEDULED
Status: DISCONTINUED | OUTPATIENT
Start: 2019-01-01 | End: 2019-01-01

## 2019-01-01 RX ORDER — FENTANYL CITRATE 50 UG/ML
50 INJECTION, SOLUTION INTRAMUSCULAR; INTRAVENOUS
Status: DISCONTINUED | OUTPATIENT
Start: 2019-01-01 | End: 2019-01-01 | Stop reason: HOSPADM

## 2019-01-01 RX ORDER — FAMOTIDINE 10 MG/ML
20 INJECTION, SOLUTION INTRAVENOUS EVERY 12 HOURS SCHEDULED
Status: DISCONTINUED | OUTPATIENT
Start: 2019-01-01 | End: 2019-01-01

## 2019-01-01 RX ORDER — HEPARIN SODIUM (PORCINE) LOCK FLUSH IV SOLN 100 UNIT/ML 100 UNIT/ML
SOLUTION INTRAVENOUS
Status: COMPLETED | OUTPATIENT
Start: 2019-01-01 | End: 2019-01-01

## 2019-01-01 RX ORDER — IPRATROPIUM BROMIDE AND ALBUTEROL SULFATE 2.5; .5 MG/3ML; MG/3ML
3 SOLUTION RESPIRATORY (INHALATION)
Qty: 360 ML | Refills: 3 | Status: SHIPPED | OUTPATIENT
Start: 2019-01-01

## 2019-01-01 RX ORDER — LEVETIRACETAM 5 MG/ML
500 INJECTION INTRAVASCULAR EVERY 12 HOURS SCHEDULED
Status: DISCONTINUED | OUTPATIENT
Start: 2019-01-01 | End: 2019-01-01

## 2019-01-01 RX ORDER — DIPHENHYDRAMINE HCL 25 MG
25 CAPSULE ORAL
Status: DISCONTINUED | OUTPATIENT
Start: 2019-01-01 | End: 2019-01-01 | Stop reason: HOSPADM

## 2019-01-01 RX ORDER — FLUMAZENIL 0.1 MG/ML
0.2 INJECTION INTRAVENOUS AS NEEDED
Status: DISCONTINUED | OUTPATIENT
Start: 2019-01-01 | End: 2019-01-01 | Stop reason: HOSPADM

## 2019-01-01 RX ORDER — HYDROCODONE BITARTRATE AND ACETAMINOPHEN 5; 325 MG/1; MG/1
1 TABLET ORAL EVERY 4 HOURS PRN
Status: DISCONTINUED | OUTPATIENT
Start: 2019-01-01 | End: 2019-01-01 | Stop reason: HOSPADM

## 2019-01-01 RX ORDER — FAMOTIDINE 10 MG/ML
20 INJECTION, SOLUTION INTRAVENOUS ONCE
Status: COMPLETED | OUTPATIENT
Start: 2019-01-01 | End: 2019-01-01

## 2019-01-01 RX ORDER — PROPOFOL 10 MG/ML
VIAL (ML) INTRAVENOUS AS NEEDED
Status: DISCONTINUED | OUTPATIENT
Start: 2019-01-01 | End: 2019-01-01 | Stop reason: SURG

## 2019-01-01 RX ORDER — LORAZEPAM 0.5 MG/1
0.5 TABLET ORAL EVERY 8 HOURS PRN
Qty: 90 TABLET | Refills: 0 | Status: SHIPPED | OUTPATIENT
Start: 2019-01-01 | End: 2019-01-01 | Stop reason: SDUPTHER

## 2019-01-01 RX ORDER — HYDRALAZINE HYDROCHLORIDE 20 MG/ML
5 INJECTION INTRAMUSCULAR; INTRAVENOUS
Status: DISCONTINUED | OUTPATIENT
Start: 2019-01-01 | End: 2019-01-01 | Stop reason: HOSPADM

## 2019-01-01 RX ORDER — SUFENTANIL CITRATE 50 UG/ML
INJECTION EPIDURAL; INTRAVENOUS AS NEEDED
Status: DISCONTINUED | OUTPATIENT
Start: 2019-01-01 | End: 2019-01-01 | Stop reason: SURG

## 2019-01-01 RX ORDER — DIPHENHYDRAMINE HYDROCHLORIDE 50 MG/ML
12.5 INJECTION INTRAMUSCULAR; INTRAVENOUS
Status: DISCONTINUED | OUTPATIENT
Start: 2019-01-01 | End: 2019-01-01 | Stop reason: HOSPADM

## 2019-01-01 RX ORDER — PROMETHAZINE HYDROCHLORIDE 25 MG/ML
12.5 INJECTION, SOLUTION INTRAMUSCULAR; INTRAVENOUS ONCE AS NEEDED
Status: DISCONTINUED | OUTPATIENT
Start: 2019-01-01 | End: 2019-01-01 | Stop reason: HOSPADM

## 2019-01-01 RX ORDER — IPRATROPIUM BROMIDE AND ALBUTEROL SULFATE 2.5; .5 MG/3ML; MG/3ML
3 SOLUTION RESPIRATORY (INHALATION)
Qty: 360 ML | Refills: 3 | Status: SHIPPED | OUTPATIENT
Start: 2019-01-01 | End: 2019-01-01 | Stop reason: SDUPTHER

## 2019-01-01 RX ORDER — FENTANYL CITRATE 50 UG/ML
INJECTION, SOLUTION INTRAMUSCULAR; INTRAVENOUS
Status: COMPLETED | OUTPATIENT
Start: 2019-01-01 | End: 2019-01-01

## 2019-01-01 RX ORDER — SODIUM CHLORIDE 9 MG/ML
INJECTION, SOLUTION INTRAVENOUS AS NEEDED
Status: DISCONTINUED | OUTPATIENT
Start: 2019-01-01 | End: 2019-01-01 | Stop reason: HOSPADM

## 2019-01-01 RX ORDER — LIDOCAINE HYDROCHLORIDE 10 MG/ML
0.5 INJECTION, SOLUTION EPIDURAL; INFILTRATION; INTRACAUDAL; PERINEURAL ONCE AS NEEDED
Status: DISCONTINUED | OUTPATIENT
Start: 2019-01-01 | End: 2019-01-01 | Stop reason: HOSPADM

## 2019-01-01 RX ORDER — MIDAZOLAM HYDROCHLORIDE 1 MG/ML
1 INJECTION INTRAMUSCULAR; INTRAVENOUS
Status: DISCONTINUED | OUTPATIENT
Start: 2019-01-01 | End: 2019-01-01 | Stop reason: HOSPADM

## 2019-01-01 RX ADMIN — LORAZEPAM 1 MG: 2 INJECTION INTRAMUSCULAR; INTRAVENOUS at 00:17

## 2019-01-01 RX ADMIN — SODIUM CHLORIDE 250 MG: 9 INJECTION, SOLUTION INTRAVENOUS at 16:15

## 2019-01-01 RX ADMIN — DEXAMETHASONE 4 MG: 4 TABLET ORAL at 22:14

## 2019-01-01 RX ADMIN — SODIUM CHLORIDE 250 MG: 9 INJECTION, SOLUTION INTRAVENOUS at 10:00

## 2019-01-01 RX ADMIN — LIDOCAINE HYDROCHLORIDE 1 EACH: 40 SOLUTION TOPICAL at 13:24

## 2019-01-01 RX ADMIN — ACETAMINOPHEN 650 MG: 325 TABLET ORAL at 09:14

## 2019-01-01 RX ADMIN — ACETAMINOPHEN 650 MG: 325 TABLET ORAL at 13:40

## 2019-01-01 RX ADMIN — DEXAMETHASONE SODIUM PHOSPHATE 20 MG: 4 INJECTION INTRA-ARTICULAR; INTRALESIONAL; INTRAMUSCULAR; INTRAVENOUS; SOFT TISSUE at 10:31

## 2019-01-01 RX ADMIN — FENTANYL CITRATE 50 MCG: 50 INJECTION, SOLUTION INTRAMUSCULAR; INTRAVENOUS at 16:05

## 2019-01-01 RX ADMIN — CEFAZOLIN SODIUM 2 G: 2 INJECTION, SOLUTION INTRAVENOUS at 20:50

## 2019-01-01 RX ADMIN — ACETAMINOPHEN 650 MG: 325 TABLET ORAL at 02:12

## 2019-01-01 RX ADMIN — INSULIN LISPRO 2 UNITS: 100 INJECTION, SOLUTION INTRAVENOUS; SUBCUTANEOUS at 09:20

## 2019-01-01 RX ADMIN — SODIUM CHLORIDE 100 ML: 900 INJECTION, SOLUTION INTRAVENOUS at 09:48

## 2019-01-01 RX ADMIN — NICOTINE 1 PATCH: 21 PATCH, EXTENDED RELEASE TRANSDERMAL at 10:48

## 2019-01-01 RX ADMIN — PALONOSETRON 0.25 MG: 0.25 INJECTION, SOLUTION INTRAVENOUS at 09:47

## 2019-01-01 RX ADMIN — PROPOFOL 50 MG: 10 INJECTION, EMULSION INTRAVENOUS at 13:53

## 2019-01-01 RX ADMIN — INSULIN LISPRO 4 UNITS: 100 INJECTION, SOLUTION INTRAVENOUS; SUBCUTANEOUS at 13:39

## 2019-01-01 RX ADMIN — ACETAMINOPHEN 650 MG: 325 TABLET ORAL at 22:06

## 2019-01-01 RX ADMIN — PROPOFOL 150 MG: 10 INJECTION, EMULSION INTRAVENOUS at 13:20

## 2019-01-01 RX ADMIN — IOPAMIDOL 99 ML: 612 INJECTION, SOLUTION INTRAVENOUS at 09:45

## 2019-01-01 RX ADMIN — PHENYLEPHRINE HYDROCHLORIDE 100 MCG: 10 INJECTION INTRAVENOUS at 14:27

## 2019-01-01 RX ADMIN — BENZONATATE 200 MG: 100 CAPSULE ORAL at 11:29

## 2019-01-01 RX ADMIN — DEXAMETHASONE 4 MG: 4 TABLET ORAL at 05:41

## 2019-01-01 RX ADMIN — HYDROMORPHONE HYDROCHLORIDE 0.5 MG: 1 INJECTION, SOLUTION INTRAMUSCULAR; INTRAVENOUS; SUBCUTANEOUS at 22:59

## 2019-01-01 RX ADMIN — FAMOTIDINE 20 MG: 10 INJECTION INTRAVENOUS at 08:58

## 2019-01-01 RX ADMIN — ACETAMINOPHEN 650 MG: 325 TABLET ORAL at 20:52

## 2019-01-01 RX ADMIN — SODIUM CHLORIDE 250 MG: 9 INJECTION, SOLUTION INTRAVENOUS at 09:52

## 2019-01-01 RX ADMIN — GUAIFENESIN AND DEXTROMETHORPHAN 5 ML: 100; 10 SYRUP ORAL at 08:03

## 2019-01-01 RX ADMIN — HYDROCORTISONE SODIUM SUCCINATE 100 MG: 100 INJECTION, POWDER, FOR SOLUTION INTRAMUSCULAR; INTRAVENOUS at 20:50

## 2019-01-01 RX ADMIN — INSULIN LISPRO 2 UNITS: 100 INJECTION, SOLUTION INTRAVENOUS; SUBCUTANEOUS at 13:24

## 2019-01-01 RX ADMIN — INSULIN LISPRO 4 UNITS: 100 INJECTION, SOLUTION INTRAVENOUS; SUBCUTANEOUS at 21:50

## 2019-01-01 RX ADMIN — INSULIN LISPRO 4 UNITS: 100 INJECTION, SOLUTION INTRAVENOUS; SUBCUTANEOUS at 06:40

## 2019-01-01 RX ADMIN — SODIUM CHLORIDE 250 MG: 9 INJECTION, SOLUTION INTRAVENOUS at 21:15

## 2019-01-01 RX ADMIN — POTASSIUM CHLORIDE 10 MEQ: 7.46 INJECTION, SOLUTION INTRAVENOUS at 11:26

## 2019-01-01 RX ADMIN — HYDRALAZINE HYDROCHLORIDE 5 MG: 20 INJECTION INTRAMUSCULAR; INTRAVENOUS at 15:13

## 2019-01-01 RX ADMIN — NEOSTIGMINE METHYLSULFATE 2 MG: 1 INJECTION INTRAMUSCULAR; INTRAVENOUS; SUBCUTANEOUS at 15:00

## 2019-01-01 RX ADMIN — SODIUM CHLORIDE, PRESERVATIVE FREE 3 ML: 5 INJECTION INTRAVENOUS at 09:13

## 2019-01-01 RX ADMIN — CEFAZOLIN SODIUM 2 G: 2 INJECTION, SOLUTION INTRAVENOUS at 04:49

## 2019-01-01 RX ADMIN — SODIUM CHLORIDE, PRESERVATIVE FREE 3 ML: 5 INJECTION INTRAVENOUS at 21:50

## 2019-01-01 RX ADMIN — LEVETIRACETAM 500 MG: 5 INJECTION INTRAVENOUS at 13:24

## 2019-01-01 RX ADMIN — HEPARIN SODIUM (PORCINE) LOCK FLUSH IV SOLN 100 UNIT/ML 500 UNITS: 100 SOLUTION at 09:36

## 2019-01-01 RX ADMIN — SUFENTANIL CITRATE 10 MCG: 50 INJECTION EPIDURAL; INTRAVENOUS at 13:53

## 2019-01-01 RX ADMIN — DIATRIZOATE MEGLUMINE AND DIATRIZOATE SODIUM 30 ML: 600; 100 SOLUTION ORAL; RECTAL at 08:02

## 2019-01-01 RX ADMIN — INSULIN LISPRO 4 UNITS: 100 INJECTION, SOLUTION INTRAVENOUS; SUBCUTANEOUS at 11:29

## 2019-01-01 RX ADMIN — INSULIN LISPRO 7 UNITS: 100 INJECTION, SOLUTION INTRAVENOUS; SUBCUTANEOUS at 17:26

## 2019-01-01 RX ADMIN — BENZONATATE 200 MG: 100 CAPSULE ORAL at 04:39

## 2019-01-01 RX ADMIN — SODIUM CHLORIDE 15 MG/HR: 9 INJECTION, SOLUTION INTRAVENOUS at 22:04

## 2019-01-01 RX ADMIN — ACETAMINOPHEN 650 MG: 325 TABLET ORAL at 12:29

## 2019-01-01 RX ADMIN — IPRATROPIUM BROMIDE AND ALBUTEROL SULFATE 3 ML: 2.5; .5 SOLUTION RESPIRATORY (INHALATION) at 11:07

## 2019-01-01 RX ADMIN — LORAZEPAM 1 MG: 2 INJECTION INTRAMUSCULAR; INTRAVENOUS at 09:30

## 2019-01-01 RX ADMIN — GUAIFENESIN AND DEXTROMETHORPHAN 5 ML: 100; 10 SYRUP ORAL at 08:21

## 2019-01-01 RX ADMIN — LEVETIRACETAM 500 MG: 500 TABLET, FILM COATED ORAL at 22:14

## 2019-01-01 RX ADMIN — LEVETIRACETAM 1000 MG: 10 INJECTION INTRAVENOUS at 03:25

## 2019-01-01 RX ADMIN — FAMOTIDINE 20 MG: 10 INJECTION INTRAVENOUS at 11:50

## 2019-01-01 RX ADMIN — NICOTINE 1 PATCH: 21 PATCH, EXTENDED RELEASE TRANSDERMAL at 08:58

## 2019-01-01 RX ADMIN — NICOTINE 1 PATCH: 21 PATCH TRANSDERMAL at 03:47

## 2019-01-01 RX ADMIN — SODIUM CHLORIDE 5 MG/HR: 9 INJECTION, SOLUTION INTRAVENOUS at 16:28

## 2019-01-01 RX ADMIN — GUAIFENESIN AND DEXTROMETHORPHAN 5 ML: 100; 10 SYRUP ORAL at 22:16

## 2019-01-01 RX ADMIN — SODIUM CHLORIDE 250 MG: 9 INJECTION, SOLUTION INTRAVENOUS at 03:15

## 2019-01-01 RX ADMIN — GUAIFENESIN AND DEXTROMETHORPHAN 5 ML: 100; 10 SYRUP ORAL at 09:21

## 2019-01-01 RX ADMIN — LEVETIRACETAM 500 MG: 5 INJECTION INTRAVENOUS at 21:00

## 2019-01-01 RX ADMIN — INSULIN LISPRO 4 UNITS: 100 INJECTION, SOLUTION INTRAVENOUS; SUBCUTANEOUS at 18:43

## 2019-01-01 RX ADMIN — POTASSIUM CHLORIDE 10 MEQ: 7.46 INJECTION, SOLUTION INTRAVENOUS at 10:01

## 2019-01-01 RX ADMIN — IPRATROPIUM BROMIDE AND ALBUTEROL SULFATE 3 ML: 2.5; .5 SOLUTION RESPIRATORY (INHALATION) at 15:17

## 2019-01-01 RX ADMIN — CEFAZOLIN SODIUM 2 G: 2 INJECTION, SOLUTION INTRAVENOUS at 13:50

## 2019-01-01 RX ADMIN — ACETAMINOPHEN 650 MG: 325 TABLET ORAL at 21:42

## 2019-01-01 RX ADMIN — GUAIFENESIN AND DEXTROMETHORPHAN 5 ML: 100; 10 SYRUP ORAL at 16:11

## 2019-01-01 RX ADMIN — GLYCOPYRROLATE 0.3 MG: 0.2 INJECTION INTRAMUSCULAR; INTRAVENOUS at 15:00

## 2019-01-01 RX ADMIN — SODIUM CHLORIDE, PRESERVATIVE FREE 3 ML: 5 INJECTION INTRAVENOUS at 09:00

## 2019-01-01 RX ADMIN — IPRATROPIUM BROMIDE AND ALBUTEROL SULFATE 3 ML: 2.5; .5 SOLUTION RESPIRATORY (INHALATION) at 19:57

## 2019-01-01 RX ADMIN — CEFAZOLIN SODIUM 2 G: 2 INJECTION, SOLUTION INTRAVENOUS at 05:41

## 2019-01-01 RX ADMIN — SODIUM CHLORIDE 250 MG: 9 INJECTION, SOLUTION INTRAVENOUS at 14:59

## 2019-01-01 RX ADMIN — HYDROMORPHONE HYDROCHLORIDE 0.5 MG: 1 INJECTION, SOLUTION INTRAMUSCULAR; INTRAVENOUS; SUBCUTANEOUS at 18:05

## 2019-01-01 RX ADMIN — NICOTINE 1 PATCH: 21 PATCH, EXTENDED RELEASE TRANSDERMAL at 08:18

## 2019-01-01 RX ADMIN — DEXAMETHASONE 4 MG: 4 TABLET ORAL at 13:25

## 2019-01-01 RX ADMIN — ONDANSETRON 4 MG: 2 INJECTION INTRAMUSCULAR; INTRAVENOUS at 14:45

## 2019-01-01 RX ADMIN — SODIUM CHLORIDE 250 MG: 9 INJECTION, SOLUTION INTRAVENOUS at 08:18

## 2019-01-01 RX ADMIN — HYDROCODONE BITARTRATE AND ACETAMINOPHEN 1 TABLET: 5; 325 TABLET ORAL at 18:58

## 2019-01-01 RX ADMIN — SODIUM CHLORIDE 250 ML: 900 INJECTION, SOLUTION INTRAVENOUS at 09:45

## 2019-01-01 RX ADMIN — IOPAMIDOL 85 ML: 612 INJECTION, SOLUTION INTRAVENOUS at 09:19

## 2019-01-01 RX ADMIN — SODIUM CHLORIDE 50 ML/HR: 9 INJECTION, SOLUTION INTRAVENOUS at 06:33

## 2019-01-01 RX ADMIN — FENTANYL CITRATE 50 MCG: 50 INJECTION, SOLUTION INTRAMUSCULAR; INTRAVENOUS at 12:32

## 2019-01-01 RX ADMIN — NICOTINE 1 PATCH: 21 PATCH, EXTENDED RELEASE TRANSDERMAL at 09:23

## 2019-01-01 RX ADMIN — INSULIN LISPRO 6 UNITS: 100 INJECTION, SOLUTION INTRAVENOUS; SUBCUTANEOUS at 12:20

## 2019-01-01 RX ADMIN — POTASSIUM CHLORIDE AND SODIUM CHLORIDE 100 ML/HR: 900; 150 INJECTION, SOLUTION INTRAVENOUS at 06:22

## 2019-01-01 RX ADMIN — HYDROMORPHONE HYDROCHLORIDE 0.5 MG: 1 INJECTION, SOLUTION INTRAMUSCULAR; INTRAVENOUS; SUBCUTANEOUS at 19:07

## 2019-01-01 RX ADMIN — FENTANYL CITRATE 100 MCG: 50 INJECTION, SOLUTION INTRAMUSCULAR; INTRAVENOUS at 09:15

## 2019-01-01 RX ADMIN — POTASSIUM CHLORIDE 40 MEQ: 750 CAPSULE, EXTENDED RELEASE ORAL at 06:58

## 2019-01-01 RX ADMIN — GADOBENATE DIMEGLUMINE 15 ML: 529 INJECTION, SOLUTION INTRAVENOUS at 18:15

## 2019-01-01 RX ADMIN — GUAIFENESIN AND DEXTROMETHORPHAN 5 ML: 100; 10 SYRUP ORAL at 14:02

## 2019-01-01 RX ADMIN — GUAIFENESIN AND DEXTROMETHORPHAN 5 ML: 100; 10 SYRUP ORAL at 00:00

## 2019-01-01 RX ADMIN — BENZONATATE 200 MG: 100 CAPSULE ORAL at 03:06

## 2019-01-01 RX ADMIN — LORAZEPAM 0.5 MG: 2 INJECTION INTRAMUSCULAR; INTRAVENOUS at 09:42

## 2019-01-01 RX ADMIN — IPRATROPIUM BROMIDE AND ALBUTEROL SULFATE 3 ML: 2.5; .5 SOLUTION RESPIRATORY (INHALATION) at 14:39

## 2019-01-01 RX ADMIN — FENTANYL CITRATE 50 MCG: 50 INJECTION, SOLUTION INTRAMUSCULAR; INTRAVENOUS at 09:17

## 2019-01-01 RX ADMIN — BENZONATATE 200 MG: 100 CAPSULE ORAL at 18:11

## 2019-01-01 RX ADMIN — EPHEDRINE SULFATE 10 MG: 50 INJECTION INTRAMUSCULAR; INTRAVENOUS; SUBCUTANEOUS at 13:39

## 2019-01-01 RX ADMIN — SUFENTANIL CITRATE 50 MCG: 50 INJECTION EPIDURAL; INTRAVENOUS at 13:20

## 2019-01-01 RX ADMIN — SENNOSIDES,DOCUSATE SODIUM 2 TABLET: 50; 8.6 TABLET, FILM COATED ORAL at 22:14

## 2019-01-01 RX ADMIN — GADOTERIDOL 13 ML: 279.3 INJECTION, SOLUTION INTRAVENOUS at 16:30

## 2019-01-01 RX ADMIN — HYDROCODONE BITARTRATE AND ACETAMINOPHEN 1 TABLET: 5; 325 TABLET ORAL at 12:20

## 2019-01-01 RX ADMIN — DIPHENHYDRAMINE HYDROCHLORIDE 50 MG: 50 INJECTION, SOLUTION INTRAMUSCULAR; INTRAVENOUS at 11:53

## 2019-01-01 RX ADMIN — MIDAZOLAM 2 MG: 1 INJECTION INTRAMUSCULAR; INTRAVENOUS at 12:28

## 2019-01-01 RX ADMIN — ACETAMINOPHEN 650 MG: 325 TABLET ORAL at 16:10

## 2019-01-01 RX ADMIN — NICOTINE 1 PATCH: 21 PATCH, EXTENDED RELEASE TRANSDERMAL at 08:06

## 2019-01-01 RX ADMIN — INSULIN LISPRO 4 UNITS: 100 INJECTION, SOLUTION INTRAVENOUS; SUBCUTANEOUS at 22:00

## 2019-01-01 RX ADMIN — HYDROMORPHONE HYDROCHLORIDE 0.5 MG: 1 INJECTION, SOLUTION INTRAMUSCULAR; INTRAVENOUS; SUBCUTANEOUS at 18:01

## 2019-01-01 RX ADMIN — CEFAZOLIN SODIUM 2 G: 2 INJECTION, SOLUTION INTRAVENOUS at 13:25

## 2019-01-01 RX ADMIN — INSULIN LISPRO 2 UNITS: 100 INJECTION, SOLUTION INTRAVENOUS; SUBCUTANEOUS at 18:15

## 2019-01-01 RX ADMIN — LORAZEPAM 1 MG: 2 INJECTION INTRAMUSCULAR; INTRAVENOUS at 22:05

## 2019-01-01 RX ADMIN — BENZONATATE 200 MG: 100 CAPSULE ORAL at 08:09

## 2019-01-01 RX ADMIN — MIDAZOLAM 1 MG: 1 INJECTION INTRAMUSCULAR; INTRAVENOUS at 09:15

## 2019-01-01 RX ADMIN — IPRATROPIUM BROMIDE AND ALBUTEROL SULFATE 3 ML: 2.5; .5 SOLUTION RESPIRATORY (INHALATION) at 07:50

## 2019-01-01 RX ADMIN — SODIUM CHLORIDE 200 MG: 900 INJECTION, SOLUTION INTRAVENOUS at 10:53

## 2019-01-01 RX ADMIN — BENZONATATE 200 MG: 100 CAPSULE ORAL at 12:24

## 2019-01-01 RX ADMIN — ACETAMINOPHEN 650 MG: 325 TABLET ORAL at 09:21

## 2019-01-01 RX ADMIN — ACETAMINOPHEN 650 MG: 325 TABLET ORAL at 08:10

## 2019-01-01 RX ADMIN — DEXAMETHASONE SODIUM PHOSPHATE 10 MG: 10 INJECTION, SOLUTION INTRAMUSCULAR; INTRAVENOUS at 03:24

## 2019-01-01 RX ADMIN — ROCURONIUM BROMIDE 40 MG: 10 INJECTION INTRAVENOUS at 13:20

## 2019-01-01 RX ADMIN — FAMOTIDINE 20 MG: 20 TABLET, FILM COATED ORAL at 22:14

## 2019-01-01 RX ADMIN — LEVETIRACETAM 500 MG: 5 INJECTION INTRAVENOUS at 10:01

## 2019-01-01 RX ADMIN — HYDROCORTISONE SODIUM SUCCINATE 100 MG: 100 INJECTION, POWDER, FOR SOLUTION INTRAMUSCULAR; INTRAVENOUS at 02:15

## 2019-01-01 RX ADMIN — CEFAZOLIN SODIUM 2 G: 2 INJECTION, SOLUTION INTRAVENOUS at 12:30

## 2019-01-01 RX ADMIN — SODIUM CHLORIDE, PRESERVATIVE FREE 3 ML: 5 INJECTION INTRAVENOUS at 09:21

## 2019-01-01 RX ADMIN — MIDAZOLAM 0.5 MG: 1 INJECTION INTRAMUSCULAR; INTRAVENOUS at 09:17

## 2019-01-01 RX ADMIN — SODIUM CHLORIDE, PRESERVATIVE FREE 3 ML: 5 INJECTION INTRAVENOUS at 21:00

## 2019-01-01 RX ADMIN — LEVETIRACETAM 500 MG: 5 INJECTION INTRAVENOUS at 21:43

## 2019-01-01 RX ADMIN — INSULIN LISPRO 2 UNITS: 100 INJECTION, SOLUTION INTRAVENOUS; SUBCUTANEOUS at 13:27

## 2019-01-01 RX ADMIN — FAMOTIDINE 20 MG: 10 INJECTION INTRAVENOUS at 09:52

## 2019-01-01 RX ADMIN — ONDANSETRON 4 MG: 2 INJECTION INTRAMUSCULAR; INTRAVENOUS at 09:08

## 2019-01-01 RX ADMIN — BENZONATATE 200 MG: 100 CAPSULE ORAL at 09:12

## 2019-01-01 RX ADMIN — CARBOPLATIN 770 MG: 10 INJECTION, SOLUTION INTRAVENOUS at 15:36

## 2019-01-01 RX ADMIN — CEFAZOLIN SODIUM 2 G: 2 INJECTION, SOLUTION INTRAVENOUS at 22:15

## 2019-01-01 RX ADMIN — FAMOTIDINE 20 MG: 10 INJECTION INTRAVENOUS at 21:00

## 2019-01-01 RX ADMIN — DEXAMETHASONE SODIUM PHOSPHATE 8 MG: 10 INJECTION INTRAMUSCULAR; INTRAVENOUS at 13:46

## 2019-01-01 RX ADMIN — SODIUM CHLORIDE, PRESERVATIVE FREE 3 ML: 5 INJECTION INTRAVENOUS at 20:50

## 2019-01-01 RX ADMIN — ACETAMINOPHEN 650 MG: 650 SUPPOSITORY RECTAL at 08:18

## 2019-01-01 RX ADMIN — SODIUM CHLORIDE 250 MG: 9 INJECTION, SOLUTION INTRAVENOUS at 14:45

## 2019-01-01 RX ADMIN — FAMOTIDINE 20 MG: 20 TABLET, FILM COATED ORAL at 09:20

## 2019-01-01 RX ADMIN — INSULIN LISPRO 7 UNITS: 100 INJECTION, SOLUTION INTRAVENOUS; SUBCUTANEOUS at 22:14

## 2019-01-01 RX ADMIN — LORAZEPAM 1 MG: 2 INJECTION INTRAMUSCULAR; INTRAVENOUS at 03:25

## 2019-01-01 RX ADMIN — POTASSIUM CHLORIDE 40 MEQ: 750 CAPSULE, EXTENDED RELEASE ORAL at 18:58

## 2019-01-01 RX ADMIN — PACLITAXEL 355 MG: 6 INJECTION, SOLUTION INTRAVENOUS at 12:17

## 2019-01-01 RX ADMIN — GUAIFENESIN AND DEXTROMETHORPHAN 5 ML: 100; 10 SYRUP ORAL at 06:40

## 2019-01-01 RX ADMIN — FENTANYL CITRATE 50 MCG: 50 INJECTION, SOLUTION INTRAMUSCULAR; INTRAVENOUS at 15:55

## 2019-01-01 RX ADMIN — SODIUM CHLORIDE 10 MG/HR: 9 INJECTION, SOLUTION INTRAVENOUS at 19:03

## 2019-01-01 RX ADMIN — SENNOSIDES,DOCUSATE SODIUM 2 TABLET: 50; 8.6 TABLET, FILM COATED ORAL at 21:50

## 2019-01-01 RX ADMIN — GUAIFENESIN AND DEXTROMETHORPHAN 5 ML: 100; 10 SYRUP ORAL at 22:59

## 2019-01-01 RX ADMIN — HYDROMORPHONE HYDROCHLORIDE 0.5 MG: 1 INJECTION, SOLUTION INTRAMUSCULAR; INTRAVENOUS; SUBCUTANEOUS at 10:01

## 2019-01-01 RX ADMIN — LEVETIRACETAM 500 MG: 5 INJECTION INTRAVENOUS at 20:50

## 2019-01-01 RX ADMIN — SODIUM CHLORIDE, POTASSIUM CHLORIDE, SODIUM LACTATE AND CALCIUM CHLORIDE 9 ML/HR: 600; 310; 30; 20 INJECTION, SOLUTION INTRAVENOUS at 12:24

## 2019-01-01 RX ADMIN — LEVETIRACETAM 500 MG: 5 INJECTION INTRAVENOUS at 08:04

## 2019-01-01 RX ADMIN — SODIUM CHLORIDE 250 MG: 9 INJECTION, SOLUTION INTRAVENOUS at 21:54

## 2019-01-01 RX ADMIN — LORAZEPAM 1 MG: 2 INJECTION INTRAMUSCULAR; INTRAVENOUS at 18:02

## 2019-01-01 RX ADMIN — LEVETIRACETAM 500 MG: 500 TABLET, FILM COATED ORAL at 09:20

## 2019-01-01 RX ADMIN — LABETALOL 20 MG/4 ML (5 MG/ML) INTRAVENOUS SYRINGE 5 MG: at 16:18

## 2019-01-01 RX ADMIN — GUAIFENESIN AND DEXTROMETHORPHAN 5 ML: 100; 10 SYRUP ORAL at 11:29

## 2019-01-01 RX ADMIN — GADOBENATE DIMEGLUMINE 14 ML: 529 INJECTION, SOLUTION INTRAVENOUS at 15:43

## 2019-01-01 RX ADMIN — HYDROCODONE BITARTRATE AND ACETAMINOPHEN 1 TABLET: 5; 325 TABLET ORAL at 07:01

## 2019-01-01 RX ADMIN — LEVETIRACETAM 500 MG: 5 INJECTION INTRAVENOUS at 08:59

## 2019-01-01 RX ADMIN — Medication 10 ML: at 02:53

## 2019-01-01 RX ADMIN — FAMOTIDINE 20 MG: 10 INJECTION INTRAVENOUS at 08:18

## 2019-01-01 RX ADMIN — FAMOTIDINE 20 MG: 10 INJECTION INTRAVENOUS at 20:50

## 2019-01-01 RX ADMIN — FENTANYL CITRATE 50 MCG: 50 INJECTION, SOLUTION INTRAMUSCULAR; INTRAVENOUS at 12:28

## 2019-01-01 RX ADMIN — LEVETIRACETAM 500 MG: 5 INJECTION INTRAVENOUS at 08:18

## 2019-01-01 RX ADMIN — SODIUM CHLORIDE, PRESERVATIVE FREE 3 ML: 5 INJECTION INTRAVENOUS at 08:07

## 2019-01-01 RX ADMIN — SENNOSIDES,DOCUSATE SODIUM 2 TABLET: 50; 8.6 TABLET, FILM COATED ORAL at 21:42

## 2019-01-01 RX ADMIN — FAMOTIDINE 20 MG: 10 INJECTION INTRAVENOUS at 08:04

## 2019-01-01 RX ADMIN — SODIUM CHLORIDE 10 MG/HR: 9 INJECTION, SOLUTION INTRAVENOUS at 23:24

## 2019-01-01 RX ADMIN — SODIUM CHLORIDE 250 MG: 9 INJECTION, SOLUTION INTRAVENOUS at 08:58

## 2019-01-01 RX ADMIN — HYDROCODONE BITARTRATE AND ACETAMINOPHEN 1 TABLET: 5; 325 TABLET ORAL at 22:24

## 2019-01-01 RX ADMIN — SENNOSIDES,DOCUSATE SODIUM 2 TABLET: 50; 8.6 TABLET, FILM COATED ORAL at 21:00

## 2019-01-01 RX ADMIN — NICOTINE 1 PATCH: 21 PATCH, EXTENDED RELEASE TRANSDERMAL at 10:02

## 2019-01-01 RX ADMIN — SODIUM CHLORIDE, POTASSIUM CHLORIDE, SODIUM LACTATE AND CALCIUM CHLORIDE: 600; 310; 30; 20 INJECTION, SOLUTION INTRAVENOUS at 13:10

## 2019-01-01 RX ADMIN — LIDOCAINE HYDROCHLORIDE 75 MG: 20 INJECTION, SOLUTION INFILTRATION; PERINEURAL at 13:20

## 2019-01-01 RX ADMIN — HYDROCORTISONE SODIUM SUCCINATE 100 MG: 100 INJECTION, POWDER, FOR SOLUTION INTRAMUSCULAR; INTRAVENOUS at 08:32

## 2019-01-01 RX ADMIN — POTASSIUM CHLORIDE AND SODIUM CHLORIDE 100 ML/HR: 900; 150 INJECTION, SOLUTION INTRAVENOUS at 19:07

## 2019-01-01 RX ADMIN — HYDROMORPHONE HYDROCHLORIDE 0.5 MG: 1 INJECTION, SOLUTION INTRAMUSCULAR; INTRAVENOUS; SUBCUTANEOUS at 03:09

## 2019-01-01 RX ADMIN — HYDROMORPHONE HYDROCHLORIDE 0.5 MG: 1 INJECTION, SOLUTION INTRAMUSCULAR; INTRAVENOUS; SUBCUTANEOUS at 07:22

## 2019-01-01 RX ADMIN — ACETAMINOPHEN 650 MG: 325 TABLET ORAL at 06:36

## 2019-01-01 RX ADMIN — PEGFILGRASTIM 6 MG: KIT SUBCUTANEOUS at 16:18

## 2019-01-01 RX ADMIN — FAMOTIDINE 20 MG: 10 INJECTION INTRAVENOUS at 10:01

## 2019-01-01 RX ADMIN — INSULIN LISPRO 4 UNITS: 100 INJECTION, SOLUTION INTRAVENOUS; SUBCUTANEOUS at 17:09

## 2019-01-01 RX ADMIN — FLUDEOXYGLUCOSE F18 1 DOSE: 300 INJECTION INTRAVENOUS at 14:00

## 2019-01-01 RX ADMIN — HYDROCODONE BITARTRATE AND ACETAMINOPHEN 1 TABLET: 5; 325 TABLET ORAL at 13:55

## 2019-01-01 RX ADMIN — FAMOTIDINE 20 MG: 10 INJECTION INTRAVENOUS at 21:43

## 2019-01-01 RX ADMIN — CEFAZOLIN SODIUM 1 G: 1 INJECTION, POWDER, FOR SOLUTION INTRAMUSCULAR; INTRAVENOUS at 08:47

## 2019-06-23 PROBLEM — G93.89 BRAIN MASS: Status: ACTIVE | Noted: 2019-01-01

## 2019-06-26 NOTE — ANESTHESIA PROCEDURE NOTES
Airway  Urgency: elective    Airway not difficult    General Information and Staff    Patient location during procedure: OR  Anesthesiologist: Jose Edouard MD    Indications and Patient Condition  Indications for airway management: airway protection    Preoxygenated: yes  MILS maintained throughout  Mask difficulty assessment: 1 - vent by mask    Final Airway Details  Final airway type: endotracheal airway      Successful airway: ETT  Cuffed: yes   Successful intubation technique: direct laryngoscopy  Endotracheal tube insertion site: oral  Blade: Heather  Blade size: 3  ETT size (mm): 8.0  Cormack-Lehane Classification: grade I - full view of glottis  Placement verified by: chest auscultation and capnometry   Measured from: lips  ETT to lips (cm): 23  Number of attempts at approach: 1

## 2019-06-26 NOTE — ANESTHESIA PROCEDURE NOTES
Arterial Line    Pre-sedation assessment completed: 6/26/2019 12:20 PM    Patient reassessed immediately prior to procedure    Patient location during procedure: holding area  Start time: 6/26/2019 12:20 PM  Stop Time:6/26/2019 12:30 PM       Line placed for hemodynamic monitoring, ABGs/Labs/ISTAT, MD/Surgeon request and respiratory failure.  Performed By   Anesthesiologist: Yosi Hernandez MD  Preanesthetic Checklist  Completed: patient identified, surgical consent, pre-op evaluation, timeout performed, IV checked, risks and benefits discussed and monitors and equipment checked  Arterial Line Prep   Sterile Tech: cap, gloves, gown, mask and sterile barriers  Prep: ChloraPrep  Patient monitoring: blood pressure monitoring, continuous pulse oximetry and EKG  Arterial Line Procedure   Laterality:right  Location:  radial artery  Catheter size: 20 G   Guidance: ultrasound guided  PROCEDURE NOTE/ULTRASOUND INTERPRETATION.  Using ultrasound guidance the potential vascular sites for insertion of the catheter were visualized to determine the patency of the vessel to be used for vascular access.  After selecting the appropriate site for insertion, the needle was visualized under ultrasound being inserted into the radial artery, followed by ultrasound confirmation of wire and catheter placement. There were no abnormalities seen on ultrasound; an image was taken; and the patient tolerated the procedure with no complications.   Number of attempts: 1  Successful placement: yes          Post Assessment   Dressing Type: occlusive dressing applied, secured with tape and wrist guard applied.   Complications no  Circ/Move/Sens Assessment: normal.   Patient Tolerance: patient tolerated the procedure well with no apparent complications

## 2019-06-26 NOTE — ANESTHESIA PREPROCEDURE EVALUATION
Anesthesia Evaluation     Patient summary reviewed and Nursing notes reviewed                Airway   Mallampati: II  Dental      Pulmonary    (+) pneumonia , a smoker Current,   Cardiovascular     ECG reviewed  Rhythm: irregular  Rate: normal    (+) hyperlipidemia,       Neuro/Psych- negative ROS  GI/Hepatic/Renal/Endo - negative ROS     Musculoskeletal (-) negative ROS    Abdominal    Substance History - negative use     OB/GYN negative ob/gyn ROS         Other                        Anesthesia Plan    ASA 3     general   (Right radial art line)  intravenous induction   Anesthetic plan, all risks, benefits, and alternatives have been provided, discussed and informed consent has been obtained with: patient.

## 2019-06-26 NOTE — ANESTHESIA POSTPROCEDURE EVALUATION
Patient: Fernie Ramos    Procedure Summary     Date:  06/26/19 Room / Location:  [facility] OR  / [facility] MAIN OR    Anesthesia Start:  1310 Anesthesia Stop:  1533    Procedure:  Left parieto-occipital craniotomy for tumor (Left Head) Diagnosis:       Brain mass      (Brain mass [G93.9])    Surgeon:  Jarred Fischer MD Provider:  Dany Cross MD    Anesthesia Type:  general ASA Status:  3          Anesthesia Type: general  Last vitals  BP   143/76 (06/26/19 1700)   Temp   36.7 °C (98 °F) (06/26/19 1700)   Pulse   100 (06/26/19 1700)   Resp   20 (06/26/19 1700)     SpO2   96 % (06/26/19 1700)     Post Anesthesia Care and Evaluation    Patient location during evaluation: bedside  Patient participation: complete - patient participated  Level of consciousness: awake and alert  Pain management: adequate  Airway patency: patent  Anesthetic complications: No anesthetic complications    Cardiovascular status: acceptable  Respiratory status: acceptable  Hydration status: acceptable    Comments: /76   Pulse 100   Temp 36.7 °C (98 °F) (Oral)   Resp 20   Wt 68.3 kg (150 lb 9.2 oz)   SpO2 96%   BMI 22.24 kg/m²

## 2019-06-28 PROBLEM — C34.91 METASTATIC PRIMARY LUNG CANCER, RIGHT (HCC): Status: ACTIVE | Noted: 2019-01-01

## 2019-06-28 PROBLEM — C79.31 METASTATIC CANCER TO BRAIN (HCC): Status: ACTIVE | Noted: 2019-01-01

## 2019-06-29 NOTE — OUTREACH NOTE
Prep Survey      Responses   Facility patient discharged from?  [place]   Is patient eligible?  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  General Surgery   Does the patient have Home health ordered?  No   Is there a DME ordered?  No   Prep survey completed?  Yes          Sherrie Sears RN

## 2019-06-30 NOTE — OUTREACH NOTE
General Surgery Week 1 Survey      Responses   Facility patient discharged from?  [place]   Does the patient have one of the following disease processes/diagnoses(primary or secondary)?  General Surgery   Is there a successful TCM telephone encounter documented?  No   Week 1 attempt successful?  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  Yes   Is the patient having any side effects they believe may be caused by any medication additions or changes?  No   Does the patient have all medications related to this admission filled (includes all antibiotics, pain medications, etc.)  Yes   Is the patient taking all medications as directed (includes completed medication regime)?  Yes   Does the patient have a follow up appointment scheduled with their surgeon?  Yes   Has the patient kept scheduled appointments due by today?  N/A   Has all DME been delivered?  Yes   DME comments  Has home nebulizer at sons   Psychosocial issues?  No   Did the patient receive a copy of their discharge instructions?  Yes   Nursing interventions  Reviewed instructions with patient   What is the patient's perception of their health status since discharge?  Improving   Nursing interventions  Nurse provided patient education   Is the patient /caregiver able to teach back basic post-op care?  Drive as instructed by MD in discharge instructions, Lifting as instructed by MD in discharge instructions, Take showers only when approved by MD-sponge bathe until then, No tub bath, swimming, or hot tub until instructed by MD, Keep incision areas clean,dry and protected   Is the patient/caregiver able to teach back signs and symptoms of incisional infection?  Increased redness, swelling or pain at the  incisonal site, Pus or odor from incision, Incisional warmth, Increased drainage or bleeding, Fever   Is the patient/caregiver able to teach back steps to recovery at home?  Weigh daily, Practice good oral hygiene, Eat a well-balance diet, Make a list of questions for surgeon's appointment, Rest and rebuild strength, gradually increase activity, Set small, achievable goals for return to baseline health   If the patient is a current smoker, are they able to teach back resources for cessation?  Smoking cessation medications [Pt has stopped smoking.]   Is the patient/caregiver able to teach back the hierarchy of who to call/visit for symptoms/problems? PCP, Specialist, Home health nurse, Urgent Care, ED, 911  Yes   Week 1 call completed?  Yes          Jennie Dominguez RN

## 2019-07-01 NOTE — TELEPHONE ENCOUNTER
Called pt's son (Brandyn) to see if I could assist him in getting his father into radiation treatments either here at [facility] or [facility].He says they wish to proceed with tx at [facility] [place] for convienience. The phone call was then disconnected.  I called back and left a voicemail with [facility] [place]'s phone number to call and make and appt and then left the phone number here in case they need a referral or assistance in making that appt.

## 2019-07-01 NOTE — PAYOR COMM NOTE
"DISCHARGED        Florentino Naik (58 y.o. Male)     Date of Birth Social Security Number Address Home Phone MRN    [id number]  [id number] [facility] [place] [address] [phone] [id number]    Yazdanism Marital Status          None        Admission Date Admission Type Admitting Provider Attending Provider Department, Room/Bed    6/23/19 Urgent Joaquim Howard MD  [address], P594/1    Discharge Date Discharge Disposition Discharge Destination        6/28/2019 Home or Self Care              Attending Provider:  (none)   Allergies:  No Known Allergies    Isolation:  None   Infection:  None   Code Status:  Prior    Ht:  175.3 cm (69\")   Wt:  70.2 kg (154 lb 12.2 oz)    Admission Cmt:  None   Principal Problem:  None                Active Insurance as of 6/23/2019     Primary Coverage     Payor Plan Insurance Group Employer/Plan Group    [employer]HE[employer] BLUE CROSS AN[employer]M BLUE CROSS BLUE SHIELD PPO [id number]     Payor Plan Address Payor Plan Phone Number Payor Plan Fax Number Effective Dates    [address] [phone]  1/1/2018 - None Entered    [facility] [id number]       Subscriber Name Subscriber Birth Date Member ID       FLORENTINO NAIK 1960 [id number]                 Emergency Contacts      (Rel.) Home Phone Work Phone Mobile Phone    NORM NAIK (Son) -- -- [phone]               Discharge Summary      Hammad Francis MD at 6/28/2019  4:35 PM                  Date of Discharge:  6/28/2019    Discharge Diagnoses:  1. Altered mental status secondary to CNS tumor  2. 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 Esophageal thickening  4. 3.5 cm polypoid a sending colon mass versus malignant if his condition stabilizes he will need endoscopy to evaluate  5. Cough secondary to obstructing tumor  6. Steroid-induced hyperglycemia  7. Tobacco abuse      Hospital Course  Patient is a 58 y.o. male presented with with altered " mental status he was found to have a CNS mass lesion with edema and brain compression.  He also was found to have a large necrotic right hilar mass with extensive mediastinal adenopathy some invasion of the right mainstem and bronchus intermedius.  He underwent craniotomy and resection of the cyst parietal-occipital left brain mass pathology was consistent with metastatic squamous cell carcinoma immunostain is most consistent with lung primary which was consistent with his clinical findings.  Incidentally noted was an thickened esophagus possibly esophagitis could not rule out esophageal carcinoma on the CT and he had a large 3.5 cm ascending colon mass polypoid.  Once his condition stabilizes if it does he probably should have upper and lower endoscopies to evaluate both.  Patient is a smoker he is been on Catena replacement doing well.  He is on steroids for the cerebral edema and his blood sugars have been up..  He is going to stay on steroids they will be adjusted by radiation oncology.  He is seen Dr. Edwards radiation oncology and Dr. Smart oncology.  He is going to go on Metformin and have some sliding scale insulin diabetic educator did education provided him with a glucometer.  Overall his neurologic function mental status have improved significantly he does have a little bit of cognitive difficulties but he is alert and oriented and the motor changes mostly on the right side have completely resolved post resection    Procedures Performed  Procedure(s):  Left parieto-occipital craniotomy for tumor       Consults:   Consults     Date and Time Order Name Status Description    6/25/2019 0916 Inpatient Thoracic Surgery Consult Completed     6/24/2019 1058 Hematology & Oncology Inpatient Consult Completed     6/24/2019 1058 Inpatient Radiation Oncology Consult Completed     6/23/2019 0554 Inpatient Neurosurgery Consult Completed           Pertinent Test Results:   Labs:  Results from last 7 days   Lab Units  06/28/19  0547 06/27/19  0536 06/26/19  0537 06/24/19  0616 06/23/19  0704   GLUCOSE mg/dL  --  150* 170* 137* 181*   SODIUM mmol/L  --  137 136 138 136   POTASSIUM mmol/L 4.1 3.5 3.4* 4.0 4.7   MAGNESIUM mg/dL  --   --  2.2  --   --    CO2 mmol/L  --  31.3* 31.5* 27.8 27.1   CHLORIDE mmol/L  --  96* 95* 101 96*   ANION GAP mmol/L  --  9.7 9.5 9.2 12.9   CREATININE mg/dL  --  0.61* 0.61* 0.61* 0.66*   BUN mg/dL  --  12 12 12 7   BUN / CREAT RATIO   --  19.7 19.7 19.7 10.6   CALCIUM mg/dL  --  8.3* 8.8 9.1 9.5   EGFR IF NONAFRICN AM mL/min/1.73  --  135 135 135 124   ALK PHOS U/L  --   --  69 83 96   TOTAL PROTEIN g/dL  --   --  5.8* 6.6 7.1   ALT (SGPT) U/L  --   --  13 7 9   AST (SGOT) U/L  --   --  11 12 14   BILIRUBIN mg/dL  --   --  <0.2* 0.2 0.2   ALBUMIN g/dL  --   --  3.20* 3.80 3.70   GLOBULIN gm/dL  --   --  2.6 2.8 3.4     Estimated Creatinine Clearance: 129.5 mL/min (A) (by C-G formula based on SCr of 0.61 mg/dL (L)).      Results from last 7 days   Lab Units 06/28/19  0547 06/27/19  0536 06/26/19  0537  06/23/19  0704 06/23/19  0247   WBC 10*3/mm3 11.33* 13.77* 11.52*   < > 13.82* 10.40   RBC 10*6/mm3 4.27 4.22 4.39   < > 4.92 5.01   HEMOGLOBIN g/dL 11.9* 12.0* 12.4*   < > 13.7 14.6   HEMATOCRIT % 37.8 37.9 38.1   < > 43.1 43.2   MCV fL 88.5 89.8 86.8   < > 87.6 86.2   MCH pg 27.9 28.4 28.2   < > 27.8 29.0   MCHC g/dL 31.5 31.7 32.5   < > 31.8 33.7   RDW % 13.8 13.9 13.7   < > 13.7 14.6   RDW-SD fl 44.6 45.6 43.6   < > 43.9 44.2   MPV fL 8.8 8.5 8.5   < > 8.4 6.2   PLATELETS 10*3/mm3 317 287 346   < > 397 397   NEUTROPHIL % % 85.5* 88.6*  --   --  89.5* 71.5   LYMPHOCYTE % % 5.6* 7.4*  --   --  7.6* 14.6*   MONOCYTES % % 6.2 3.6*  --   --  1.0* 8.0   EOSINOPHIL % % 0.0* 0.0*  --   --  0.7 4.6   BASOPHIL % % 0.3 0.0  --   --  0.4 1.3   IMM GRAN % % 2.4* 0.4  --   --  0.8*  --    NEUTROS ABS 10*3/mm3 9.70* 12.20*  --   --  12.36* 7.50*   LYMPHS ABS 10*3/mm3 0.63* 1.02  --   --  1.05 1.50   MONOS ABS  10*3/mm3 0.70 0.50  --   --  0.14 0.80   EOS ABS 10*3/mm3 0.00 0.00  --   --  0.10 0.50*   BASOS ABS 10*3/mm3 0.03 0.00  --   --  0.06 0.10   IMMATURE GRANS (ABS) 10*3/mm3 0.27* 0.05  --   --  0.11*  --    NRBC /100 WBC 0.0  --   --   --  0.0 0.0    < > = values in this interval not displayed.         Results from last 7 days   Lab Units 06/23/19  0247   TROPONIN I ng/mL <0.030                 Results from last 7 days   Lab Units 06/27/19  0536 06/23/19  1221   INR  1.04 1.04       Imaging Results (last 72 hours)     Procedure Component Value Units Date/Time    CT Head Without Contrast [[id number]] Collected:  06/27/19 0636     Updated:  06/28/19 0500    Narrative:       CRANIAL CT SCAN WITHOUT CONTRAST     CLINICAL HISTORY: po; G93.9-Disorder of brain, unspecified     COMPARISON: 06/26/2019.     TECHNIQUE: Radiation dose reduction techniques were utilized, including  automated exposure control and exposure modulation based on body size.  Multiple axial images of the head were obtained without contrast.      FINDINGS:  Patient is again noted to be status post left posterior  parietal craniotomy and resection of a previously described intra-axial  parietal lobe mass lesion. Some fluid and hemorrhage is again seen  within the resection cavity and along its periphery. The amount of  hemorrhage within the dependent portion of the fluid collection has  increased slightly. There is likely a very small amount of hemorrhage  deep to the craniotomy flap, also which appears similar to previous.  Some mild edema is noted about the resection cavity in the adjacent  parenchyma. There is some mild mass effect including up to approximately  2 mm of left-to-right midline shift but this appears similar to  previous.  There is no hydrocephalus. Postoperative pneumocephalus has  diminished slightly.          Impression:       Slight increase in the amount of hemorrhage layering along the dependent  portion of the left parietal  resection cavity. Some mild surrounding  edema is again noted with approximately 2 mm of rightward midline shift  which is similar to previous. Recommend continued follow-up.                 This report was finalized on 6/28/2019 4:57 AM by Denilson Conner M.D.       MRI Brain With & Without Contrast [[id number]] Collected:  06/27/19 1913     Updated:  06/27/19 2055    Narrative:       MRI OF THE BRAIN WITH AND WITHOUT CONTRAST 06/27/2019     CLINICAL HISTORY: Postop brain tumor resection yesterday.     TECHNIQUE: Axial T1, FLAIR, fat-suppressed T2, axial diffusion and  sagittal T1 and postcontrast axial fat-suppressed T1 and sagittal and  coronal T1-weighted images were obtained of the entire head.      COMPARISON: The study is correlated with preoperative MRI of the brain  06/23/2019, preoperative head CT 06/23/2019 and postoperative head CT  06/26/2019 and earlier today 06/27/2019 at 4 AM.           FINDINGS: This patient has had a 5 x 5 cm posterior inferior left  parietal craniotomy for surgical debulking of peripherally enhancing  centrally necrotic mass that preoperatively extended throughout the left  parietal lobe white matter into the left parietal-occipital junction  preoperatively and the mass measured 5.3 x 4.8 x 5.2 cm in  anteroposterior, mediolateral and craniocaudal dimension. Within the  central to inferior aspect of the mass there is a postsurgical defect  with a postsurgical resection cavity measuring 3.4 x 2.8 x 2.9 cm in the  anterior, posterior, medial, lateral and craniocaudal dimension, has a  fluid/fluid level within it. There is residual heterogeneously  predominantly peripherally enhancing tumor along the superior margin of  the mass that measures 3.2 x 3.9 x 1.7 cm in mediolateral, anterior,  posterior and craniocaudal dimension and it is in a rind of residual  enhancing tumor along the lateral margin and anterior margin in the  mass. There remains some surrounding FLAIR and  T2-hyperintensity in the  left parietal white matter along the margins of the mass extending into  the superior left occipital white matter and to the left parietal  occipital periventricular white matter that tracts up along the anterior  margin of the mass up to 4.2 x 2.2 cm in medial, lateral and anterior,  posterior dimension and may be surrounding edema or infiltrating  nonenhancing tumor and there is some residual mass effect on the  posterior body, trigone and occipital horn of the left lateral ventricle  and there is persistent stable 4 to 5 mm left to right midline shift at  the level of the septum pellucidum and superior falx. There are  scattered small nodular foci of T2 high signal in the cerebral white  matter consistent with mild small vessel disease and other than the mass  effect on the posterior body, trigone and occipital horn of the left  lateral ventricle the remainder of the ventricular system is normal in  size. There is some expected postoperative air and fluid in the epidural  space deep to the 5 x 5 cm posterior inferior left parietal craniotomy  flap measuring 8 mm in anteroposterior thickness. There is otherwise no  extra-axial fluid collections, no additional abnormal areas of  enhancement are seen in the head. The paranasal sinuses, mastoid air  cells and middle ear cavities are clear. Good flow voids are  demonstrated in the cerebral vessels and in the dural venous sinuses.       Impression:       1. Since preoperative MRI 06/23/2019, yesterday 06/26/2019, the patient  had a 5 x 5 cm posterior left parietal craniotomy for surgical debulking  of a large 5.3 x 4.8 x 5.2 cm heterogeneously enhancing centrally  necrotic mass extending throughout the majority of the left parietal  lobe into the left parietal-occipital junction with debulking central  and inferior aspects of enhancing tumor with a 3.4 x 2.8 x 2.9 cm  postsurgical defect and resection cavity in the central to inferior  aspect  of the mass with a moderate amount of residual heterogeneously  enhancing tumor along the superior margin of the resection cavity that  measures up to 3.2 x 3.9 x 1.7 cm in mediolateral, anteroposterior and  craniocaudal dimension and a thinner rind of residual enhancing tumor  along the anterior and lateral posterior superior medial margin of the  resection cavity. There is stable patchy surrounding FLAIR and T2  hyperintensity along the margins of the resection cavity and residual  enhancing tumor which along the anterior margin is most prominent and  tracks up to 4.2 x 2.2 cm and is compatible with residuals stable  surrounding vasogenic edema or infiltrating nonenhancing tumor.  Correlation with final pathology results is suggested as to whether this  is a glioblastoma multiforme or less likely solitary met. There is  stable mass effect and 4 to 5 mm left to right midline shift at the  level of the superior falx and superior septum pellucidum.  2. Scattered small nodular foci of T2 high signal in the cerebral white  matter consistent with mild small vessel disease. The remainder of the  MRI of the head is unremarkable.      This report was finalized on 6/27/2019 8:52 PM by Dr. Cj Luis M.D.       CT Head Without Contrast [[id number]] Collected:  06/26/19 2046     Updated:  06/27/19 1011    Narrative:       CT HEAD WITHOUT CONTRAST     HISTORY: Brain mass, postop.     TECHNIQUE: A noncontrasted CT examination of the brain was performed and  compared to the MRI examination of 06/23/2019.     FINDINGS: A left parietal craniotomy is noted. There is a resection  cavity appreciated measuring approximately 3.5 cm in diameter which  corresponds in location to the large peripherally enhancing mass noted  on the preoperative MRI examination. There is a minimal degree of blood  products along the margins of the resection cavity, not unexpected.  Beam-hardening artifact from the craniotomy limits evaluation  somewhat.  There is no evidence of infarction, hydrocephalus or of midline shift.       Impression:       Status post resection of a large peripherally enhancing  lesion involving the left parietal lobe. Expected postoperative changes  are noted. Residual tumor could not be excluded. Further evaluation  could be performed with MRI examination of brain with and without  contrast.           Radiation dose reduction techniques were utilized, including automated  exposure control and exposure modulation based on body size.     This report was finalized on 6/27/2019 10:08 AM by Dr. Syk Chino M.D.                    Condition on Discharge: Much improved    Vital Signs  Temp:  [97.3 °F (36.3 °C)-99.4 °F (37.4 °C)] 99.4 °F (37.4 °C)  Heart Rate:  [65-97] 96  Resp:  [17-20] 18  BP: (130-149)/(60-77) 145/70    Physical Exam:  General Appearance: Well-developed white male resting comfortably in bed does not appear in any acute distress  Eyes: Conjunctiva are clear and anicteric  ENT: Mucous membranes are moist no erythema or exudates, no facial symmetry tongue is midline nasal septum midline and he has staples left posterior occiput incision which is clean and dry  Neck: No adenopathy no thyromegaly no jugular venous distention trachea midline  Lungs: Clear nonlabored symmetric expansion no wheezes rales or rhonchi  Cardiac: Regular rate rhythm no murmur  Abdomen: Soft no palpable organomegaly or masses  : Not examined  Musc/Skel: Grossly normal  Skin: No jaundice, no petechiae skin is warm and dry to touch  Neuro: He is alert and oriented x3 he is cooperative he is got good dorsiflexion plantarflexion straight leg raise bilaterally good heel-to-shin bilaterally no pronator drift good  finger-to-nose to finger without difficulty  Extremities/P Vascular: No clubbing no cyanosis no edema palpable radial dorsalis pedis pulses bilaterally  MSE: He seems to be in reasonably good spirits      Discharge Disposition  Home  or Self Care    Discharge Medications     Discharge Medications      New Medications      Instructions Start Date   benzonatate 200 MG capsule  Commonly known as:  TESSALON   200 mg, Oral, 3 Times Daily PRN      dexamethasone 4 MG tablet  Commonly known as:  DECADRON   4 mg, Oral, Every 12 Hours Scheduled      famotidine 20 MG tablet  Commonly known as:  PEPCID   20 mg, Oral, Every 12 Hours      guaifenesin-dextromethorphan 100-10 MG/5ML syrup  Commonly known as:  ROBITUSSIN DM   5 mL, Oral, Every 4 Hours PRN      HYDROcodone-acetaminophen 5-325 MG per tablet  Commonly known as:  NORCO   1 tablet, Oral, Every 4 Hours PRN      Insulin Lispro 100 UNIT/ML solution pen-injector  Commonly known as:  HUMALOG KWIKPEN   For blood sugar 200 to 250- 4 units, 251-300- 8u, 301 to 350- 12 u.,  351 to 400- 16u.,  401 to 450- 20U.,  Greater than 451 call PCP      ipratropium-albuterol 0.5-2.5 mg/3 ml nebulizer  Commonly known as:  DUO-NEB   3 mL, Nebulization, 4 Times Daily - RT      levETIRAcetam 500 MG tablet  Commonly known as:  KEPPRA   500 mg, Oral, Every 12 Hours Scheduled      LORazepam 0.5 MG tablet  Commonly known as:  ATIVAN   0.5 mg, Oral, Every 8 Hours PRN      metFORMIN 500 MG tablet  Commonly known as:  GLUCOPHAGE   500 mg, Oral, 2 Times Daily With Meals      nicotine 21 MG/24HR patch  Commonly known as:  NICODERM CQ   1 patch, Transdermal, Every 24 Hours Scheduled   Start Date:  6/29/2019     sennosides-docusate sodium 8.6-50 MG tablet  Commonly known as:  SENOKOT-S   2 tablets, Oral, Nightly             Discharge Diet: Consistent carbohydrate    Activity at Discharge:     Follow-up Appointments  Future Appointments   Date Time Provider Department Center   7/11/2019 10:00 AM Shana Edwards MD [facility] None   7/17/2019  2:45 PM Mickie Kinney, PA-C [facility] None         Test Results Pending at Discharge   Order Current Status    Anaerobic Culture - Wound, Brain In process    Wound Culture - Wound, Brain  Preliminary result           Hammad Francis MD  06/28/19  4:42 PM    Time: I have spent over 45 minutes on patient's discharge today          Electronically signed by Hammad Francis MD at 6/28/2019  4:47 PM

## 2019-07-01 NOTE — PROGRESS NOTES
Case Management Discharge Note    Final Note: Discharged home. Family transported         Transportation Services  Private: Car    Final Discharge Disposition Code: 01 - home or self-care

## 2019-07-10 PROBLEM — H92.01 OTALGIA, RIGHT: Status: ACTIVE | Noted: 2018-05-08

## 2019-07-10 PROBLEM — J30.9 ALLERGIC RHINITIS: Status: ACTIVE | Noted: 2018-05-08

## 2019-07-10 PROBLEM — J01.00 SINUSITIS, ACUTE MAXILLARY: Status: ACTIVE | Noted: 2018-05-08

## 2019-07-10 NOTE — PROGRESS NOTES
Radiation Oncology Consult Note    Name: Fernie Ramos  YOB: 1960  MRN #: [id number]  Date of Service: 7/12/2019  Referring Provider: Referring, Self  [place] [id number]  Primary Care Provider: William, No Known  I was asked to see the patient at the request of the referring provider noted below for advice and recommendations regarding this diagnosis and the role of radiation therapy.  RECORDS OBTAINED:  Records of the patients history including those obtained from the referring provider were reviewed and summarized in detail.      DIAGNOSIS: Fernie Ramos is a 58 y.o. male with SCC of the RUL Stage CATRINA [id number], PD-L1 + s/p STR of a 5x5cm L parietal metastasis. He has multiple intrathoracic nodules concerning for metastases.     REASON FOR CONSULTATION/CHIEF COMPLAINT:  Lung cancer and brain metastasis.     HISTORY OF PRESENT ILLNESS:    58 y.o.  male with a history of tobacco abuse who presented as follows:  • 6/23/19 Patient presents to ED with altered mental status.He was found to have a 5.3 x 4.8 x 5.2 cm peripherally contrast enhancing mass centered within the left parietal lobe  • 6/24/19 CT chest abdomen and pelvis showed a large R necrotic right hilar mass with extensive mediastinal adenopathy invasion of the right mainstem and bronchus intermedius with multiple bilateral pulmonary metastases  • 6/26/19 Resection of L parietal brain tumor was consistent with metastatic squamous cell carcinoma; immunostain is most consistent with lung primary and is PD-L1+.   • 6/27/19 Post op MRI showed residual disease toward the superior and inferior aspects of the cavity.  · He presents today with complaints of persistent cough productive of phlegm. He has some dyspnea on exertion. He does not use oxygen. He denies night sweats, fever, and chills. He does note weight loss. He has a mild headache and diffuse weakness but otherwise denies any neurologic  complaint.    The following portions of the patient's history were reviewed and updated as appropriate: allergies, current medications, past family history, past medical history, past social history, past surgical history and problem list. Reviewed with the patient and remain unchanged.    PAST MEDICAL HISTORY:  he  has a past medical history of Bronchitis, Hyperlipidemia, Malignant neoplasm of right lung (CMS/HCC) (2019), Neoplasm of brain (CMS/[facility]) (2019), and Pneumonia.    MEDICATIONS:   Current Outpatient Medications:   •  benzonatate (TESSALON) 200 MG capsule, Take 1 capsule by mouth 3 (Three) Times a Day As Needed for Cough., Disp: 90 capsule, Rfl: 1  •  dexamethasone (DECADRON) 4 MG tablet, Take 1 tablet by mouth Every 12 (Twelve) Hours., Disp: 60 tablet, Rfl: 0  •  Doxylamine-DM (VICKS DAYQUIL/NYQUIL COUGH PO), Take  by mouth., Disp: , Rfl:   •  famotidine (PEPCID) 20 MG tablet, Take 1 tablet by mouth Every 12 (Twelve) Hours., Disp: 60 tablet, Rfl: 0  •  fluconazole (DIFLUCAN) 100 MG tablet, Take 2 tablets by mouth Daily., Disp: 5 tablet, Rfl: 0  •  guaifenesin-dextromethorphan (ROBITUSSIN DM) 100-10 MG/5ML syrup, Take 5 mL by mouth Every 4 (Four) Hours As Needed for Cough., Disp: 1000 mL, Rfl: 0  •  HYDROcodone-acetaminophen (NORCO) 5-325 MG per tablet, Take 1 tablet by mouth Every 4 (Four) Hours As Needed for Moderate Pain  for up to 30 days., Disp: 180 tablet, Rfl: 0  •  Insulin Lispro (HUMALOG KWIKPEN) 100 UNIT/ML solution pen-injector, For blood sugar 200 to 250- 4 units, 251-300- 8u, 301 to 350- 12 u.,  351 to 400- 16u.,  401 to 450- 20U.,  Greater than 451 call PCP, Disp: 1 pen, Rfl: 1  •  ipratropium-albuterol (DUO-NEB) 0.5-2.5 mg/3 ml nebulizer, Take 3 mL by nebulization 4 (Four) Times a Day., Disp: 360 mL, Rfl: 3  •  levETIRAcetam (KEPPRA) 500 MG tablet, Take 1 tablet by mouth Every 12 (Twelve) Hours., Disp: 60 tablet, Rfl: 0  •  LORazepam (ATIVAN) 0.5 MG tablet, Take 1 tablet by mouth Every 8  (Eight) Hours As Needed for Anxiety., Disp: 90 tablet, Rfl: 0  •  metFORMIN (GLUCOPHAGE) 500 MG tablet, Take 1 tablet by mouth 2 (Two) Times a Day With Meals., Disp: 60 tablet, Rfl: 1  •  nicotine (NICODERM CQ) 21 MG/24HR patch, Place 1 patch on the skin as directed by provider Daily., Disp: 21 patch, Rfl: 0  •  Nystatin (MAGIC MOUTHWASH), Swish and spit 5 mL Every 4 (Four) Hours As Needed (oral thrush or mucositis)., Disp: , Rfl:   •  sennosides-docusate sodium (SENOKOT-S) 8.6-50 MG tablet, Take 2 tablets by mouth Every Night., Disp: 60 tablet, Rfl: 0    ALLERGIES: No Known Allergies    PAST SURGICAL HISTORY: he has a past surgical history that includes Back surgery and Craniotomy for Tumor (Left, 6/26/2019).    PREVIOUS RADIOTHERAPY OR CHEMOTHERAPY: No    FAMILY HISTORY: his family history is not on file.    SOCIAL HISTORY: he  reports that he has been smoking cigarettes.  He has been smoking about 1.00 pack per day. He has never used smokeless tobacco. He reports that he uses drugs. Drug: Marijuana. He reports that he does not drink alcohol.    PAIN AND PAIN MANAGEMENT: no issues  NUTRITIONAL STATUS:  Patient with 10lb weight loss    KPS: 80:  Normal activity with effort; some signs or symptoms  ECOG: (2) Ambulatory and capable of self care, unable to carry out work activity, up and about > 50% or waking hours       Review of Systems:   Review of Systems   Constitutional: Positive for unexpected weight change.   Genitourinary: Positive for frequency.   Neurological: Positive for weakness.   Otherwise negative as below.     General: No fevers, chills, or drenching night sweats. Skin: No rashes or jaundice.  HEENT: No change in vision or hearing, no headaches.  Neck: No dysphagia or masses.  Heme/Lymph: No easy bruising or bleeding.  Respiratory System: No shortness of breath or cough.  Cardiovascular: No chest pain, palpitations.  - Pacemaker. GI: No nausea, vomiting, diarrhea, melena, or hematochezia.  : No  "dysuria or hematuria.  Endocrine: No heat or cold intolerance. Musculoskeletal: No myalgias or arthralgias.  Neuro: No numbness, syncope, or seizures. Psych: No mood changes or depression. Ext: Denies swelling.        Objective     Vitals:  Vitals:    07/10/19 1025   BP: 131/79   Pulse: 67   Resp: 18   Temp: 98.3 °F (36.8 °C)   TempSrc: Oral   SpO2: 99%   Weight: 64.3 kg (141 lb 12.8 oz)   Height: 175.3 cm (69\")   PainSc:   2   PainLoc: Head         PHYSICAL EXAM:  GENERAL: in no apparent distress, sitting comfortably in room.    HEENT: normocephalic, atraumatic. Pupils are equal, round, reactive to light. Sclera anicteric. Conjunctiva not injected. Oropharynx without erythema, ulcerations or thrush. Poor dentition  NECK: Supple with no masses.  LYMPHATIC: no cervical, supraclavicular adenopathy appreciated bilaterally.   CARDIOVASCULAR: S1 & S2 detected; no murmurs, rubs or gallops.  CHEST: clear to auscultation bilaterally; no wheezes, crackles or rubs. Work of breathing normal. Wet cough  ABDOMEN: Abdomen is soft, nontender, nondistended.   MUSCULOSKELETAL: no tenderness to palpation along the spine or scapulae. Normal range of motion.  EXTREMITIES: no clubbing, cyanosis, edema.  SKIN: no erythema, rashes, ulcerations noted.   NEUROLOGIC: cranial nerves II-XII grossly intact bilaterally. No focal neurologic deficits.  PSYCHIATRIC:  alert, aware, and appropriate.    PERTINENT IMAGING/PATHOLOGY/LABS (Medical Decision Making):     COORDINATION OF CARE: A copy of this note is sent to the referring provider.    PATHOLOGY (Reviewed):   Pathology 6/26/19     1. Left Parietal Occipital Mass:               A. Metastatic poorly differentiated squamous cell carcinoma with extensive necrosis (see comment).                 2. Left Parietal Occipital Mass:               A. Metastatic poorly differentiated squamous cell carcinoma with extensive necrosis (see comment).     PD-L1=positive TPS= 40%  Comment    Material from parts " 1 and 2 appear similar disclosing metastatic poorly differentiated non small cell carcinoma with focal clear cell component.  Extensive necrosis is noted.  Immunostains including pan cytokeratin, CK 5/6, CK7, P40, P63, CK20, CDX2, TTF-1, and PAX-8 were attempted on the metastatic malignancy utilizing appropriate controls.  The malignancy demonstrates strong immunoreactivity for pan cytokeratin, CK5/6, P40, and P63.  Tumor also shows focal strong immunoreactivity for CK7, but is judged negative for TTF-1, PAX-8, and CK20.  Rare focal weak immunoreactivity for CD2 is noted.  The results of immunostaining best support a diagnosis of metastatic squamous cell carcinoma most likely of lung origin.  Other possible sites of metastatic squamous cell carcinoma should be eliminated from the differential diagnosis.  Clinical and radiologic correlation are required.  The original H&E stained material is shared in internal consultation with Dr. Cain, who concurred with the above findings.         IMAGING (Reviewed):   MRI OF THE BRAIN WITH AND WITHOUT CONTRAST 06/23/19 (Pre-operative)     FINDINGS: There is a large peripherally contrast enhancing mass which is seen centered within the left parietal lobe with extension into the left occipital lobe. The mass measures up to approximately 5.3 x 4.8 cm in greatest axial dimensions and approximately 5.2 cm in greatest craniocaudad dimensions. There is vasogenic edema which circumscribes this lesion and this involves the white matter of the left frontal, parietal, and occipital lobes as well as the left temporal lobe and the left external capsule. This mass results in sulcal effacement as well as effacement of the posterior aspect left lateral ventricle. There is midline shift to the right by approximately 5 mm. There is no other convincing intracranial mass noted. There are no abnormal areas of restricted diffusion to suggest foci of acute infarction. The peripherally enhancing areas  within this aforementioned mass do demonstrate mild diffusion-weighted hyperintensity.  Otherwise, the ventricles, sulci, and cisterns are age appropriate. Mild changes of chronic small vessel ischemic phenomena are noted.  IMPRESSION:  A 5.3 x 4.8 x 5.2 cm peripherally contrast enhancing mass centered within the left parietal lobe with extension into the left occipital lobe with circumscribing vasogenic edema resulting in mass effect with midline shift to the right by approximately 5 mm. No other convincing abnormal contrast enhancing lesion is noted to suggest another abnormal enhancing abnormality intracranially. Differential diagnostic possibilities include a solitary brain metastasis versus a high-grade glioma such as a glioblastoma multiforme.        MRI OF THE BRAIN WITH AND WITHOUT CONTRAST 06/27/2019 (Post-operative)  FINDINGS: This patient has had a 5 x 5 cm posterior inferior left parietal craniotomy for surgical debulking of peripherally enhancing centrally necrotic mass that preoperatively extended throughout the left parietal lobe white matter into the left parietal-occipital junction preoperatively and the mass measured 5.3 x 4.8 x 5.2 cm in anteroposterior, mediolateral and craniocaudal dimension. Within the central to inferior aspect of the mass there is a postsurgical defect  with a postsurgical resection cavity measuring 3.4 x 2.8 x 2.9 cm in the anterior, posterior, medial, lateral and craniocaudal dimension, has a fluid/fluid level within it. There is residual heterogeneously predominantly peripherally enhancing tumor along the superior margin of  the mass that measures 3.2 x 3.9 x 1.7 cm in mediolateral, anterior, posterior and craniocaudal dimension and it is in a rind of residual enhancing tumor along the lateral margin and anterior margin in the mass. There remains some surrounding FLAIR and T2-hyperintensity in the left parietal white matter along the margins of the mass extending into the  superior left occipital white matter and to the left parietal occipital periventricular white matter that tracts up along the anterior margin of the mass up to 4.2 x 2.2 cm in medial, lateral and anterior, posterior dimension and may be surrounding edema or infiltrating nonenhancing tumor and there is some residual mass effect on the posterior body, trigone and occipital horn of the left lateral ventricleand there is persistent stable 4 to 5 mm left to right midline shift at the level of the septum pellucidum and superior falx. There are scattered small nodular foci of T2 high signal in the cerebral white matter consistent with mild small vessel disease and other than the mass effect on the posterior body, trigone and occipital horn of the left  lateral ventricle the remainder of the ventricular system is normal in size. There is some expected postoperative air and fluid in the epidural space deep to the 5 x 5 cm posterior inferior left parietal craniotomy flap measuring 8 mm in anteroposterior thickness. There is otherwise no extra-axial fluid collections, no additional abnormal areas of enhancement are seen in the head. The paranasal sinuses, mastoid air cells and middle ear cavities are clear. Good flow voids are  demonstrated in the cerebral vessels and in the dural venous sinuses.     IMPRESSION:  1. Since preoperative MRI 06/23/2019, yesterday 06/26/2019, the patient had a 5 x 5 cm posterior left parietal craniotomy for surgical debulking of a large 5.3 x 4.8 x 5.2 cm heterogeneously enhancing centrally necrotic mass extending throughout the majority of the left parietal  lobe into the left parietal-occipital junction with debulking central  and inferior aspects of enhancing tumor with a 3.4 x 2.8 x 2.9 cm postsurgical defect and resection cavity in the central to inferior aspect of the mass with a moderate amount of residual heterogeneously enhancing tumor along the superior margin of the resection cavity  that measures up to 3.2 x 3.9 x 1.7 cm in mediolateral, anteroposterior and craniocaudal dimension and a thinner rind of residual enhancing tumor along the anterior and lateral posterior superior medial margin of the resection cavity. There is stable patchy surrounding FLAIR and T2 hyperintensity along the margins of the resection cavity and residual enhancing tumor which along the anterior margin is most prominent and tracks up to 4.2 x 2.2 cm and is compatible with residuals stable surrounding vasogenic edema or infiltrating nonenhancing tumor.  Correlation with final pathology results is suggested as to whether this is a glioblastoma multiforme or less likely solitary met. There is stable mass effect and 4 to 5 mm left to right midline shift at the level of the superior falx and superior septum pellucidum.  2. Scattered small nodular foci of T2 high signal in the cerebral white matter consistent with mild small vessel disease. The remainder of the MRI of the head is unremarkable.        CT chest abdomen and pelvis 6/24/19   FINDINGS:  CHEST: There is a large heterogeneous necrotic right upper lobe lung mass occluding the right upper lobe bronchus measuring approximately 7.2 x 4.8 cm. There is extensive postobstructive pneumonia and atelectasis at the right upper lobe. There are multiple large irregular pulmonary nodules also present in the right upper lobe and there are pulmonary nodules scattered throughout both lung fields. 1 of the largest nodules is at the left lower lobe measuring 1.6 cm. There is infiltrative and fairly bulky lymphadenopathy throughout the mediastinum and both sven.There is an endobronchial component within the right mainstem bronchus.There is significant narrowing of the right middle lobe bronchus and the proximal aspect of the right lower lobe bronchus by the right hilar lymphadenopathy. There is a moderately circumferentially thickened appearance of the mid and distal esophagus. There is  pulmonary vascular congestion at the lower lung fields, but there are no pleural effusions  and there are no pericardial effusions. The SVC is narrowed, but is patent.     ABDOMEN/PELVIS: There is focal fatty infiltration at the medial hepatic segment along the falciform ligament. No suspicious liver lesion is seen. The adrenals appear within normal limits. The gallbladder, pancreas, spleen, and kidneys appear unremarkable. There is a polypoid-like mass within the descending colon which is best seen in its entirety on the coronal reconstruction series measuring approximately 3.4 cm, series 4 image 69. The bowel appears otherwise unremarkable. Appendix appears within normal limits. There are shotty nodes at the raphael hepatis, but there are no pathologically enlarged nodes. No free fluid. No definite suspicious bone lesion is seen.     IMPRESSION:  1. Approximately 7 cm necrotic appearing right upper lobe lung mass obstructs the right upper lobe bronchus and there are is extensive metastatic disease. There is lymphadenopathy throughout the mediastinum and both sven and there are multiple bilateral pulmonary metastases. There is an endobronchial component within the right mainstem bronchus and there is also narrowing of the right middle and lower lobe bronchi.  2. Significantly circumferentially thickened appearance of the mid and distal esophagus. The appearance may be secondary to esophagitis, but an esophageal malignancy cannot be excluded.  3. There is an approximately 3.4 cm polypoid mass within the descending colon.  4. There is no convincing evidence for metastatic disease within the abdomen or pelvis.      LABS (Reviewed):  Hematology WBC   Date Value Ref Range Status   07/08/2019 14.87 (H) 3.40 - 10.80 10*3/mm3 Final     RBC   Date Value Ref Range Status   07/08/2019 4.89 4.14 - 5.80 10*6/mm3 Final     Hemoglobin   Date Value Ref Range Status   07/08/2019 14.2 13.0 - 17.7 g/dL Final     Hematocrit   Date Value  Ref Range Status   07/08/2019 42.5 37.5 - 51.0 % Final     Platelets   Date Value Ref Range Status   07/08/2019 272 140 - 450 10*3/mm3 Final      Chemistry   Lab Results   Component Value Date    GLUCOSE 150 (H) 06/27/2019    BUN 12 06/27/2019    CREATININE 0.61 (L) 06/27/2019    EGFRIFNONA 135 06/27/2019    BCR 19.7 06/27/2019    K 4.1 06/28/2019    CO2 31.3 (H) 06/27/2019    CALCIUM 8.3 (L) 06/27/2019    ALBUMIN 3.20 (L) 06/26/2019    AST 11 06/26/2019    ALT 13 06/26/2019       ASSESSMENT AND PLAN:  Fernie Ramos is a 58 y.o. male with SCC of the RUL Stage CATRINA [id number], PD-L1 + s/p STR of a 5x5cm L parietal metastasis. He has multiple intrathoracic nodules concerning for metastases.   · Given the large lung mass with endobronchial invasion I recommend palliative external beam radiation therapy +/- concurrent systemic therapy or immunotherapy as the patient is at risk of collapse.  · I also discussed the role of fractionated stereotactic radiosurgery for the the L parietal mass. Given the persistent large size of the cavity I will re-image the brain 1 month after post op to see if there is decrease in the size of the surgical cavity. If there are no other lesions at that time then would proceed with fractionated stereotactic radiosurgery. If at that point there are multiple brain metastases then would recommend whole brain radiation therapy.   · Patient is to see medical oncology for discussion of systemic therapy.  · We will schedule the patient for radiation simulation to the lung mass in the interim.  · Patient has quit smoking and information on smoking cessation was discussed with the patient.       1. Metastatic cancer to brain (CMS/HCC)    2. Metastatic primary lung cancer, right (CMS/HCC)    3. Brain mass         Orders Placed This Encounter   Procedures   • Ambulatory Referral to Radiation Oncology-[place]     Referral Priority:   Routine     Referral Type:   Consultation     Referral Reason:    Continuity of Care     Requested Specialty:   Radiation Oncology     Number of Visits Requested:   1   • Ambulatory Referral to Oncology     Referral Priority:   Routine     Referral Reason:   Specialty Services Required     Requested Specialty:   Oncology     Number of Visits Requested:   1       This assessment comes from my review of the imaging, pathology, physician notes and other pertinent information as mentioned.    TIME SPENT WITH PATIENT:   I spent greater than 65 minutes in face-to-face time with the patient and >95% of that time were spent in counseling and coordination of care, including review of imaging and pathology; indications, goals, logistics, alternatives and risks - both common and rare - for my recommendations as well as surveillance and potential outcomes.         CC: Referring, Self Provider, No Known    Ari Edgar Matson MD  7/10/19  1:45 PM

## 2019-07-10 NOTE — OUTREACH NOTE
General Surgery Week 2 Survey      Responses   Facility patient discharged from?  [facility]   Does the patient have one of the following disease processes/diagnoses(primary or secondary)?  General Surgery   Week 2 attempt successful?  No   Unsuccessful attempts  Attempt 1          Tina Valladares RN

## 2019-07-11 NOTE — OUTREACH NOTE
General Surgery Week 2 Survey      Responses   Facility patient discharged from?  [facility]   Does the patient have one of the following disease processes/diagnoses(primary or secondary)?  General Surgery   Week 2 attempt successful?  No   Unsuccessful attempts  Attempt 2          Paddy Weaver RN

## 2019-07-15 NOTE — PROGRESS NOTES
Subjective   Patient ID: Fernie Ramos is a 58 y.o. male is here today for follow-up.  He is 3 weeks out from left parieto-occipital craniotomy with removal of a left parieto-occipital metastasis by Dr. Fischer 6/26/19. He is doing well.  He does have HA, sensitivity to light. He denies any incision problems. Mr. Ramos takes Hydrocodone 5/325 prn for pain. He takes Keppra 500 mg BID and Decadron 4 mg BID.     Brain Tumor   This is a new problem. Associated symptoms include headaches.       The following portions of the patient's history were reviewed and updated as appropriate: allergies, current medications, past family history, past medical history, past social history, past surgical history and problem list.    Review of Systems   Neurological: Positive for headaches. Negative for light-headedness.   All other systems reviewed and are negative.      Objective   Physical Exam   Neurological:   Incision ok     Neurologic Exam    Assessment/Plan   Independent Review of Radiographic Studies:      Medical Decision Making:    Mr. Enciso is 3 weeks out from a left parietal-occipital craniotomy for a met.  Pathology was consistent with lung cancer, specifically squamous cell.  He did recently see Dr. Elizondo but is in the process of transferring his care to [facility] for both medical and radiation oncology.  His incision is healing well without signs of infection.  He has some mild headaches occasionally but otherwise feels pretty good.  He did mention some pain in the right ear as well as drainage which I told him would not be directly related to surgery and recommended that he see a PCP or urgent care center.  He has had similar symptoms in the past with ear infections.    Given that he does not know exactly when he will begin radiation I will hold off on ordering his next brain MRI but will have him follow-up in 1 month.  I did tell the patient and his daughter-in-law who accompanied him that we are happy to help if he  experiences any difficulty getting appointments with radiation and medical oncology at [facility].  They will also call in the interim with any increase in headaches or new complaints or symptoms or incisional issues.  He will stay on Keppra.  We will defer to the radiation oncologist in regards to his steroid dosing.  We did also discuss his restrictions and he understands that he cannot drive.  Fernie was seen today for post-op.    Diagnoses and all orders for this visit:    Metastatic cancer to brain (CMS/HCC)    Surgery follow-up examination      Return in about 4 weeks (around 8/14/2019).

## 2019-07-16 NOTE — PROGRESS NOTES
Received call from daughter stating they needed us to make referrals to [facility] for total care take over and [facility].  Discussed with Dr. Elizondo.  Referrals placed.

## 2019-07-17 PROBLEM — Z09 SURGERY FOLLOW-UP EXAMINATION: Status: ACTIVE | Noted: 2019-01-01

## 2019-07-17 NOTE — PROGRESS NOTES
REASON FOR FOLLOW-UP:  Provide an opinion on any further workup or treatment on:   1.  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  2.  Right lung mass       HISTORY OF PRESENT ILLNESS:       Fernie Ramos is a 58 y.o. who returns today for reevaluation for her metastatic lung cancer, with brain mets post craniectomy and biopsy confirmed to be squamous cell carcinoma.  I saw him originally on 6/28/2019 as a consultation when he was hospitalized at [facility].  Patient is accompanied by his son and daughter-in-law today.    Since that time patient was discharged home.  He reports well-healed scar on the back of his head.  He denies nausea vomiting.  He denies a seizure.  He is on steroids and Keppra.  He denies cough or hemoptysis.  Patient otherwise has no specific complaints.    Patient who was admitted on 6/23/2019 with confusion. The patient states that the confusion started 3 months ago. He was having problem with remembering names and difficulty with word finding. He initially attributed the symptoms to getting old but he noticed worsening of the symptoms about 2 days before admission. This was also noticeable by his family. He was not having headaches or vision changes and did not notice weakness.      The patient has a chronic cough, usually in the morning. His son states that he would cough for about 10 minutes every morning. He did not notice any worsening. No hemoptysis.      Patient reports a weight loss of 41 lbs over the past 3 years.       Patient had a left parieto-occipital craniotomy on 6/26/2019 removing the metastatic tumor and pathology evaluation reported poorly differentiated squamous cell carcinoma.     After discharge, pathology evaluation updated was positive for PD-L1 expression at 40%.     Medical History           Past Medical History:   Diagnosis Date   • Bronchitis     • Hyperlipidemia     •  "Pneumonia              Surgical History             Past Surgical History:   Procedure Laterality Date   • BACK SURGERY                   SCHEDULED MEDS: See EMR.         ALLERGIES:  No Known Allergies      Social History   Social History                Socioeconomic History   • Marital status:        Spouse name: Not on file   • Number of children: Not on file   • Years of education: Not on file   • Highest education level: Not on file   Tobacco Use   • Smoking status: Current Every Day Smoker       Packs/day: 1.00       Types: Cigarettes   Substance and Sexual Activity   • Alcohol use: No       Frequency: Never   • Drug use: Yes       Types: Marijuana       Comment: denies use for years            Cancer-related family history is not on file.     REVIEW OF SYSTEMS:   GENERAL:  Negative for fever or fatigue.   SKIN: Negative for skin rash or lesion.  EYES:  Recent vision changes.  ENT:  Negative for mouth bleeding or epistaxis.  RESPIRATORY:  Cough. Negative for hemoptysis.   CVS:  Negative for chest pain or lower extremity swelling.   GI:  Negative for abdominal pain, dysphagia, hematochezia or melena.   :  Negative for hematuria.   MUSCULOSKELETAL:  Negative for back pain.  NEUROLOGICAL: see HPI.  Negative for headaches.  PSYCHIATRIC:  Negative for anxiety or depression.            Objective      VITAL SIGNS:    Vitals:    07/08/19 1012   BP: 139/74   Pulse: 60   Resp: 16   Temp: 97.8 °F (36.6 °C)   TempSrc: Oral   SpO2: 98%   Weight: 65.1 kg (143 lb 8 oz)   Height: 175 cm (68.9\")   PainSc: 0-No pain      PHYSICAL EXAMINATION:   GENERAL:  The patient appears well-developed well-nourished male, not in acute distress.  Performance that is ECOG 1-0.   SKIN: Warm and dry. No skin rashes. No ecchymosis or petechiae.  HEAD:  Normocephalic.  EYES: PERRL. No Jaundice. No Pallor.   NECK:  Supple. No Thyromegaly. No Masses.  LYMPHATICS:  No cervical or supraclavicular lymphadenopathy.  CHEST: Decreased breathing " sounds at the right lung apex, otherwise clear bilateral breathing sounds.  normal respiratory effort.    CARDIAC:   Sinus rhythm and normal rate.  Normal S1 & S2. No murmurs. No edema.  ABDOMEN:  Soft. No tenderness. No Hepatomegaly. No Splenomegaly. No masses.  EXTREMITIES:  No clubbing. No arthritic changes in the fingers. No Calf tenderness.   NEUROLOGICAL: Normal cranial II-XII.            RESULT REVIEW:   Lab Results   Component Value Date    WBC 14.87 (H) 07/08/2019    HGB 14.2 07/08/2019    HCT 42.5 07/08/2019    MCV 86.9 07/08/2019     07/08/2019     Lab Results   Component Value Date    NEUTROABS 12.17 (H) 07/08/2019          PATHOLOGY:    Addendum   Please see the completely scanned PD-L1 immunohistochemistry analysis from [facility] below.     PD-L1=positive TPS= 40%      Addendum electronically signed by Cj Vanessa MD on 7/1/2019 at 0997   Clinical Information    RM #[id number]   PHONE # [phone]   Final Diagnosis   1. Left Parietal Occipital Mass:               A. Metastatic poorly differentiated squamous cell carcinoma with extensive necrosis (see comment).                 2. Left Parietal Occipital Mass:               A. Metastatic poorly differentiated squamous cell carcinoma with extensive necrosis (see comment).     jat/jse    Electronically signed by Salvatore Kapadia MD on 6/28/2019 at 1506   Comment    Material from parts 1 and 2 appear similar disclosing metastatic poorly differentiated non small cell carcinoma with focal clear cell component.  Extensive necrosis is noted.  Immunostains including pan cytokeratin, CK 5/6, CK7, P40, P63, CK20, CDX2, TTF-1, and PAX-8 were attempted on the metastatic malignancy utilizing appropriate controls.  The malignancy demonstrates strong immunoreactivity for pan cytokeratin, CK5/6, P40, and P63.  Tumor also shows focal strong immunoreactivity for CK7, but is judged negative for TTF-1, PAX-8, and CK20.  Rare focal weak immunoreactivity for CD2 is  noted.  The results of immunostaining best support a diagnosis of metastatic squamous cell carcinoma most likely of lung origin.  Other possible sites of metastatic squamous cell carcinoma should be eliminated from the differential diagnosis.  Clinical and radiologic correlation are required.  The original H&E stained material is shared in internal consultation with Dr. Cain, who concurred with the above findings.      PD-L1 testing ([facility]) will be performed on block 2A and reported separately.     jat/jse               1. MRI OF THE BRAIN WITH AND WITHOUT CONTRAST 6/23/19:     CLINICAL HISTORY: Headaches, expressive aphasia, memory loss, confusion.  Brain tumor.     MRI of the brain is obtained with sagittal pre and postgadolinium T1,  axial pre and postgadolinium T1, axial postgadolinium 3D SPGR, coronal  postgadolinium T1, axial flair, axial T2, axial diffusion, and axial  gradient echo images.     FINDINGS: There is a large peripherally contrast enhancing mass which is  seen centered within the left parietal lobe with extension into the left  occipital lobe. The mass measures up to approximately 5.3 x 4.8 cm in  greatest axial dimensions and approximately 5.2 cm in greatest  craniocaudad dimensions. There is vasogenic edema which circumscribes  this lesion and this involves the white matter of the left frontal,  parietal, and occipital lobes as well as the left temporal lobe and the  left external capsule. This mass results in sulcal effacement as well as  effacement of the posterior aspect left lateral ventricle. There is  midline shift to the right by approximately 5 mm. There is no other  convincing intracranial mass noted. There are no abnormal areas of  restricted diffusion to suggest foci of acute infarction. The  peripherally enhancing areas within this aforementioned mass do  demonstrate mild diffusion-weighted hyperintensity.     Otherwise, the ventricles, sulci, and cisterns are age appropriate.  Mild  changes of chronic small vessel ischemic phenomena are noted.     IMPRESSION:  A 5.3 x 4.8 x 5.2 cm peripherally contrast enhancing mass  centered within the left parietal lobe with extension into the left  occipital lobe with circumscribing vasogenic edema resulting in mass  effect with midline shift to the right by approximately 5 mm. No other  convincing abnormal contrast enhancing lesion is noted to suggest  another abnormal enhancing abnormality intracranially. Differential  diagnostic possibilities include a solitary brain metastasis versus a  high-grade glioma such as a glioblastoma multiforme.     This report was finalized on 6/23/2019 4:56 PM by Dr. Jeremiah Montes De Oca M.D.     I personally reviewed the MRI and I concur with the findings.     2.  CT CHEST, ABDOMEN, AND PELVIS WITH IV CONTRAST 6/24/19:     HISTORY: 58-year-old male with an intracranial mass. Shortness of  breath. Evaluate for a primary malignancy.     TECHNIQUE: Radiation dose reduction techniques were utilized, including  automated exposure control and exposure modulation based on body size.   3 mm images were obtained through the chest, abdomen, and pelvis after  the administration of IV contrast. There are no previous CTs for  comparison.     FINDINGS:  CHEST: There is a large heterogeneous necrotic right upper lobe lung  mass occluding the right upper lobe bronchus measuring approximately 7.2  x 4.8 cm. There is extensive postobstructive pneumonia and atelectasis  at the right upper lobe. There are multiple large irregular pulmonary  nodules also present in the right upper lobe and there are pulmonary  nodules scattered throughout both lung fields. 1 of the largest nodules  is at the left lower lobe measuring 1.6 cm. There is infiltrative and  fairly bulky lymphadenopathy throughout the mediastinum and both sven.  There is an endobronchial component within the right mainstem bronchus.  There is significant narrowing of the right middle  lobe bronchus and the  proximal aspect of the right lower lobe bronchus by the right hilar  lymphadenopathy. There is a moderately circumferentially thickened  appearance of the mid and distal esophagus. There is pulmonary vascular  congestion at the lower lung fields, but there are no pleural effusions  and there are no pericardial effusions. The SVC is narrowed, but is  patent.     ABDOMEN/PELVIS: There is focal fatty infiltration at the medial hepatic  segment along the falciform ligament. No suspicious liver lesion is  seen. The adrenals appear within normal limits. The gallbladder,  pancreas, spleen, and kidneys appear unremarkable. There is a  polypoid-like mass within the descending colon which is best seen in its  entirety on the coronal reconstruction series measuring approximately  3.4 cm, series 4 image 69. The bowel appears otherwise unremarkable.  Appendix appears within normal limits. There are shotty nodes at the  raphael hepatis, but there are no pathologically enlarged nodes. No free  fluid. No definite suspicious bone lesion is seen.     IMPRESSION:  1. Approximately 7 cm necrotic appearing right upper lobe lung mass  obstructs the right upper lobe bronchus and there are is extensive  metastatic disease. There is lymphadenopathy throughout the mediastinum  and both sven and there are multiple bilateral pulmonary metastases.  There is an endobronchial component within the right mainstem bronchus  and there is also narrowing of the right middle and lower lobe bronchi.  2. Significantly circumferentially thickened appearance of the mid and  distal esophagus. The appearance may be secondary to esophagitis, but an  esophageal malignancy cannot be excluded.  3. There is an approximately 3.4 cm polypoid mass within the descending  colon.  4. There is no convincing evidence for metastatic disease within the  abdomen or pelvis.              Assessment/Plan      1.    Metastatic lung cancer, squamous cell  carcinoma, with a large cranial mets with significant vasogenic edema.    Status post craniectomy on 6/26/2019.    Updated pathology evaluation reported PD-L1 expression at 40%.  Patient has a larger right upper lobe mass, causing right upper lobe obstructive pneumonia with intrapulmonary metastatic disease.     I discussed with patient and recommended to obtain PET scan prior to starting systematic treatment.     I discussed with the patient and his family members today, he will need cranial radiation therapy first.  Since patient lives in [place], he will be more convenient for patient to start cranial radiation therapy at the [facility].      Since his pathology study updated as only 40% PD-L1 expression, he would not be a candidate for single agent immunotherapy using Keytruda, instead he would be combination of chemotherapy and immunotherapy Keytruda.  Because of this patient has a large right upper lobe mass causing obstructive pneumonia, I think he should be started with concurrent chemoradiation therapy with weekly carboplatin plus Taxol and radiation therapy.  After that he will be switching to every 3-week carboplatin plus Taxol plus Keytruda for 3 cycles then continue with single agent Keytruda.  His response will be monitored by his CT scan examination.    I will arrange patient for chemoradiation therapy.  I also pointed out if he wants to obtain systematic therapy in [facility], it may be more convenient for patient for his chemotherapy also.  Nevertheless since patient expressed willingness come back here to receive his systematic chemoimmunotherapy, I will make appropriate arrangement.     PLAN:     1.    Obtain PET scan next week.  2.  Chemotherapy teaching lesson next week for weekly carboplatin plus Taxol during radiation therapy, then switch to every 3-week carboplatin plus Taxol plus Keytruda.    3.  I asked patient to follow-up with [facility] for  radiation oncology.   4.  We will arrange patient come back in 3 weeks for reevaluation, to start weekly carboplatin plus Taxol.          I explained to patient and his son and daughter-in-law at bedside.      I personally reviewed the scan images, and shared with patient and his family members.       FREDERICK POSEY M.D., Ph.D.   7/8/2019      Addendum:   I was informed by our nurse today, that the patient and his family members wants to transfer over his care to [facility], including systematic treatment with chemotherapy and immunotherapy.  Patient had an PET scan examination today.      We will make the referral for patient to be seen by medical oncology service at [facility] in [facility].    FREDERICK POSEY M.D., Ph.D.    7/16/2019    CC:  Mariah Edwards M.D.    Joaquim Howard MD

## 2019-07-23 NOTE — TELEPHONE ENCOUNTER
Pt's daughter-in-law called to follow up on appts being scheduled.  Returned call, explained has appt tomorrow to see Dr. Victor.  Pt is transferring from [facility].  Explained appts going forward would probably be discussed at tomorrow's appt.

## 2019-07-24 NOTE — PROGRESS NOTES
Hematology/Oncology Outpatient Consultation    Patient name: Fernie Ramos  : 1960  MRN: [id number]  Primary Care Physician: Domenica Marks MD  Referring Physician: Sarahi Elizondo MD PhD  Reason For Consult: Lung cancer with brain metastasis     Chief Complaint   Patient presents with   • Appointment     for lung cancer with brain metastasis     Son accompanied him today.   CHALO Sevilla present during office visit.      History of Present Illness:  This patient is a 58-year-old  male smoker who was hospitalized at [facility] between 19 and 19 after having presented to [facility] with gradual onset of increasing confusion and memory difficulty.  Work-up at at [facility] ER had included CT head without contrast revealing partially calcified, partially cystic mass lesion in the left parietal lobe with mass-effect and mild midline shift from left to right by 4 mm.  Chest x-ray had abnormal right upper lung with dense opacity and left basilar 11 mm pulmonary nodule.  The patient was transferred to [facility] and underwent CT head with and without contrast revealing a complex cystic and solid mass in the left parietal lobe.  MRI brain with and without contrast revealed 5.3 x 4.8 x 5.2 peripherally contrast-enhancing mass centered within the left parietal lobe with extension to the left occipital lobe with circumscribing vasogenic edema resulting in mass-effect with midline shift to the right by approximately 5 mm.  CT CAP with contrast reveals approximately 7 cm necrotic appearing right upper lobe lung mass obstructing the right upper lobe bronchus with extensive metastatic disease.  There was lymphadenopathy throughout the mediastinum and both sven and there were multiple bilateral pulmonary metastasis.  There was an endobronchial component within the right mainstem bronchus and there was also narrowing of the right middle and lower lobe bronchi.  Significant circumferentially thickened appearance of  the mid and distal esophagus and approximately 3.4 cm polypoid mass within the descending colon was seen.  His transaminases were normal and creatinine 0.7.  He underwent left parieto-occipital craniotomy for tumor resection by Jrared Fischer MD with pathology revealing metastatic poorly differentiated squamous cell carcinoma with extensive necrosis and 2 specimens.  PD-L1 was positive with TPS 40%.  He was seen in consultation by medical oncology with plans of combined modality chemo immune therapy post completion of radiation therapy.  Post surgery MRI of the brain with and without contrast revealed a 3.4 x 2.8 x 2.9 cm postsurgical defect and a resection cavity in the central to inferior aspect of the mass with a moderate amount of residual heterogeneously enhancing tumor along the superior margin of the resection cavity that measured up to 3.2 x 3.9 x 1.7 cm in medial lateral, anterior posterior, and craniocaudal dimension with a thinner drained of residual enhancing tumor along the anterior and lateral posterior superior medial margins of the resection cavity.  There was stable patchy surrounding FLAIR and T2 hyperintensity along the margins of the resection cavity and residual enhancing tumor which along the anterior margin was most prominent and tracking up to 4.2 x 2.2 cm compatible with residual stable surrounding pathogenic edema or infiltrating nonenhancing tumor.  There was stable mass-effect and 45 mm left to right midline shift at the level of the superior falx and superior septum pellucidum.  Scattered small nodular foci of T2 high signal in the cerebral white matter consistent with mild small vessel disease were seen.  The patient had a PET scan performed on 7/8/19 revealing large cavitary right upper lobe mass obstructing the right upper lobe bronchus and appearing to invade the mediastinum.  Diffuse pulmonary metastatic disease was seen.  Extensive bilateral hilar, mediastinal, and supraclavicular  adenopathy was present.  Asymmetric increased uptake within the right vocal cord and abnormal area of focal increased uptake near the splenic flexure of the colon were seen.  The patient lives in [facility] and decided to take his treatments in [facility].  He was seen in consultation by Ari Hernández MD on 7/10/19 with the recommendation of palliative external beam radiation therapy to chest plus/minus concurrent systemic therapy in order to prevent collapse of the lung.  For the brain plan was to reimage the brain in 1 month postop to see if there is decrease in the size of the surgical cavity and if there is no other lesion at that time then would proceed with fractionated stereotactic radiosurgery.  If there are multiple brain metastasis then would treat with whole brain radiation therapy.  · 7/24/2019 patient seen in initial consultation at the cancer center complaining of weakness, cough productive of clear sputum, shortness of air on exertion.  He complained of frontal chest wall without pain 5/10 radiating up to the head better with breathing treatments and pain medications.  He gave a history of 40 pound weight loss over 3 a year period.  The case was discussed with Dr. Hernández and it was recommended by me to treat the patient with a combination of carboplatin AUC 6, paclitaxel 200 mg/m² and pembrolizumab 200 mg every 21 days for 4 cycles followed by continuation of pembrolizumab to complete a 1 year course.  We may even start the pembrolizumab during the radiation part of the treatment.    Past Medical History:   Diagnosis Date   • Bronchitis    • Hyperlipidemia    • Malignant neoplasm of right lung (CMS/HCC) 2019   • Neoplasm of brain (CMS/HCC) 2019   • Pneumonia        Past Surgical History:   Procedure Laterality Date   • BACK SURGERY  2007    low   • CRANIOTOMY FOR TUMOR Left 6/26/2019    Procedure: Left parieto-occipital craniotomy for tumor;  Surgeon: Jarred Fischer MD;  Location:   [facility];  Service: Neurosurgery         Current Outpatient Medications:   •  benzonatate (TESSALON) 200 MG capsule, Take 1 capsule by mouth 3 (Three) Times a Day As Needed for Cough., Disp: 90 capsule, Rfl: 1  •  dexamethasone (DECADRON) 4 MG tablet, Take 1 tablet by mouth Every 12 (Twelve) Hours., Disp: 60 tablet, Rfl: 0  •  Doxylamine-DM (VICKS DAYQUIL/NYQUIL COUGH PO), Take  by mouth., Disp: , Rfl:   •  famotidine (PEPCID) 20 MG tablet, Take 1 tablet by mouth Every 12 (Twelve) Hours., Disp: 60 tablet, Rfl: 0  •  fluconazole (DIFLUCAN) 100 MG tablet, Take 2 tablets by mouth Daily., Disp: 5 tablet, Rfl: 0  •  guaifenesin-dextromethorphan (ROBITUSSIN DM) 100-10 MG/5ML syrup, Take 5 mL by mouth Every 4 (Four) Hours As Needed for Cough., Disp: 1000 mL, Rfl: 0  •  HYDROcodone-acetaminophen (NORCO) 5-325 MG per tablet, Take 1 tablet by mouth Every 4 (Four) Hours As Needed for Moderate Pain  for up to 30 days., Disp: 180 tablet, Rfl: 0  •  Insulin Lispro (HUMALOG KWIKPEN) 100 UNIT/ML solution pen-injector, For blood sugar 200 to 250- 4 units, 251-300- 8u, 301 to 350- 12 u.,  351 to 400- 16u.,  401 to 450- 20U.,  Greater than 451 call PCP, Disp: 1 pen, Rfl: 1  •  ipratropium-albuterol (DUO-NEB) 0.5-2.5 mg/3 ml nebulizer, Take 3 mL by nebulization 4 (Four) Times a Day., Disp: 360 mL, Rfl: 3  •  levETIRAcetam (KEPPRA) 500 MG tablet, Take 1 tablet by mouth Every 12 (Twelve) Hours., Disp: 60 tablet, Rfl: 0  •  LORazepam (ATIVAN) 0.5 MG tablet, Take 1 tablet by mouth Every 8 (Eight) Hours As Needed for Anxiety., Disp: 90 tablet, Rfl: 0  •  metFORMIN (GLUCOPHAGE) 500 MG tablet, Take 1 tablet by mouth 2 (Two) Times a Day With Meals., Disp: 60 tablet, Rfl: 1  •  Nystatin (MAGIC MOUTHWASH), Swish and spit 5 mL Every 4 (Four) Hours As Needed (oral thrush or mucositis)., Disp: , Rfl:   •  sennosides-docusate sodium (SENOKOT-S) 8.6-50 MG tablet, Take 2 tablets by mouth Every Night., Disp: 60 tablet, Rfl: 0  •  nicotine (NICODERM  "CQ) 21 MG/24HR patch, Place 1 patch on the skin as directed by provider Daily., Disp: 21 patch, Rfl: 0    No Known Allergies      There is no immunization history on file for this patient.    Family History   Problem Relation Age of Onset   • Lung cancer Father 70       Cancer-related family history includes Lung cancer (age of onset: 70) in his father.    Social History     Tobacco Use   • Smoking status: Former Smoker     Packs/day: 1.00     Types: Cigarettes     Start date:      Last attempt to quit: 2019     Years since quittin.5   • Smokeless tobacco: Never Used   Substance Use Topics   • Alcohol use: No     Frequency: Never   • Drug use: Yes     Types: Marijuana     Comment: denies use for years       ROS:    Review of Systems   Constitutional: Negative for chills and fever.   HENT: Negative for ear pain, mouth sores, nosebleeds and sore throat.    Eyes: Negative for photophobia and visual disturbance.   Respiratory: Positive for cough ( with clear phelgm production) and shortness of breath ( on exertion ). Negative for wheezing and stridor.    Cardiovascular: Positive for chest pain ( 5-6/10). Negative for palpitations.   Gastrointestinal: Negative for abdominal pain, diarrhea, nausea and vomiting.   Endocrine: Negative for cold intolerance and heat intolerance.   Genitourinary: Negative for dysuria and hematuria.   Musculoskeletal: Negative for joint swelling and neck stiffness.        Right leg drags sometimes.    Skin: Negative for color change and rash.   Neurological: Positive for numbness ( left thumb) and headaches ( dull ). Negative for seizures and syncope.   Hematological: Negative for adenopathy.        No obvious bleeding   Psychiatric/Behavioral: Negative for agitation, confusion and hallucinations.       Objective:    Vitals:    19 1257   BP: 131/71   Pulse: 75   Resp: 18   Temp: 98.1 °F (36.7 °C)   Weight: 64.2 kg (141 lb 9.6 oz)   Height: 175.3 cm (69\")   PainSc: 0-No pain "       ECOG  (2) Ambulatory and capable of self care, unable to carry out work activity, up and about > 50% or waking hours    Physical Exam:    Physical Exam   Constitutional: He is oriented to person, place, and time. No distress.   HENT:   Head: Normocephalic and atraumatic.   Dental fillings. Tympanic membranes: clear bilaterally. Hair thinning.    Eyes: Conjunctivae and EOM are normal. Right eye exhibits no discharge. Left eye exhibits no discharge. No scleral icterus.   Neck: Normal range of motion. Neck supple. No thyromegaly present.   Cardiovascular: Normal rate, regular rhythm and normal heart sounds. Exam reveals no gallop and no friction rub.   Pulmonary/Chest: Effort normal. No stridor. No respiratory distress. He has no wheezes.   Few scattered rhonchi.    Abdominal: Soft. Bowel sounds are normal. He exhibits no mass. There is no tenderness. There is no rebound and no guarding.   Musculoskeletal: Normal range of motion. He exhibits no tenderness.   Superficial varicosities on lower extremities.    Lymphadenopathy:     He has no cervical adenopathy.   Neurological: He is alert and oriented to person, place, and time. He exhibits normal muscle tone.   Skin: Skin is warm. No rash noted. He is not diaphoretic. No erythema.   Dry scaly rash on left axilla. 10 cm scar left posterior scalp.    Psychiatric: He has a normal mood and affect. His behavior is normal.   Nursing note and vitals reviewed.      RECENT LABS  WBC   Date Value Ref Range Status   07/08/2019 14.87 (H) 3.40 - 10.80 10*3/mm3 Final     RBC   Date Value Ref Range Status   07/08/2019 4.89 4.14 - 5.80 10*6/mm3 Final     Hemoglobin   Date Value Ref Range Status   07/08/2019 14.2 13.0 - 17.7 g/dL Final     Hematocrit   Date Value Ref Range Status   07/08/2019 42.5 37.5 - 51.0 % Final     MCV   Date Value Ref Range Status   07/08/2019 86.9 79.0 - 97.0 fL Final     MCH   Date Value Ref Range Status   07/08/2019 29.0 26.6 - 33.0 pg Final     MCHC    Date Value Ref Range Status   07/08/2019 33.4 31.5 - 35.7 g/dL Final     RDW   Date Value Ref Range Status   07/08/2019 14.4 12.3 - 15.4 % Final     RDW-SD   Date Value Ref Range Status   07/08/2019 45.5 37.0 - 54.0 fl Final     MPV   Date Value Ref Range Status   07/08/2019 8.2 6.0 - 12.0 fL Final     Platelets   Date Value Ref Range Status   07/08/2019 272 140 - 450 10*3/mm3 Final     Neutrophil %   Date Value Ref Range Status   07/08/2019 81.8 (H) 42.7 - 76.0 % Final     Lymphocyte %   Date Value Ref Range Status   07/08/2019 7.1 (L) 19.6 - 45.3 % Final     Monocyte %   Date Value Ref Range Status   07/08/2019 8.7 5.0 - 12.0 % Final     Eosinophil %   Date Value Ref Range Status   07/08/2019 0.2 (L) 0.3 - 6.2 % Final     Basophil %   Date Value Ref Range Status   07/08/2019 0.2 0.0 - 1.5 % Final     Immature Grans %   Date Value Ref Range Status   07/08/2019 2.0 (H) 0.0 - 0.5 % Final     Neutrophils, Absolute   Date Value Ref Range Status   07/08/2019 12.17 (H) 1.70 - 7.00 10*3/mm3 Final     Lymphocytes, Absolute   Date Value Ref Range Status   07/08/2019 1.06 0.70 - 3.10 10*3/mm3 Final     Monocytes, Absolute   Date Value Ref Range Status   07/08/2019 1.29 (H) 0.10 - 0.90 10*3/mm3 Final     Eosinophils, Absolute   Date Value Ref Range Status   07/08/2019 0.03 0.00 - 0.40 10*3/mm3 Final     Basophils, Absolute   Date Value Ref Range Status   07/08/2019 0.03 0.00 - 0.20 10*3/mm3 Final     Immature Grans, Absolute   Date Value Ref Range Status   07/08/2019 0.29 (H) 0.00 - 0.05 10*3/mm3 Final     nRBC   Date Value Ref Range Status   07/08/2019 0.0 0.0 - 0.2 /100 WBC Final       Lab Results   Component Value Date    GLUCOSE 150 (H) 06/27/2019    BUN 12 06/27/2019    CREATININE 0.61 (L) 06/27/2019    EGFRIFNONA 135 06/27/2019    BCR 19.7 06/27/2019    K 4.1 06/28/2019    CO2 31.3 (H) 06/27/2019    CALCIUM 8.3 (L) 06/27/2019    ALBUMIN 3.20 (L) 06/26/2019    AST 11 06/26/2019    ALT 13 06/26/2019          Assessment/Plan     Metastatic primary lung cancer, right (CMS/HCC)  - Nursing Communication  - IR Central Line Placement  - IR Central Line Placement  - CBC & Differential  - Comprehensive Metabolic Panel    Screening for colon cancer  - Occult Blood X 3, Stool - Stool, Per Rectum  - Ambulatory Referral For Screening Colonoscopy    Mass of colon    I have discussed the patient's diagnosis in detail with him and with his son today and made him aware of the noncurable nature of this disease.  I recommended treating him with a combination of carboplatin, paclitaxel and pembrolizumab post completion of radiation with possibly starting the pembrolizumab even during the radiation.  I have discussed the response rates and side effects of these treatments in detail with the patient today.  Given the mass in the colon I like him to have a colonoscopy performed, and I will proceed with an Qqlvit-c-Ezim insertion.  I will check baseline labs on him and he is in agreement to this plan.    I have reviewed labs results, imaging, vitals, and medications with the patient today.  Will follow up in 1 month with me.    Patient verbalized understanding and is in agreement of the above plan.    I have reviewed and agree with the above information.   Edson Victor M.D., F.A.C.P.             Much of the above report is an electronic transcription//translation of the spoken language to printed text using Dragon Software. As such, the subtleties and finesse of the spoken language may permit erroneous, or at times, nonsensical words or phrases to be inadvertently transcribed; thus changes may be made at a later date to rectify these errors.

## 2019-07-24 NOTE — TELEPHONE ENCOUNTER
Called and spoke with pts son Brandyn and notified him that Dr. Victor wants pt to have a colonoscopy due to CT scan is showing a mass in his colon. He will let his father know. Informed him that a  would be calling him with appt. Order entered. mh

## 2019-07-25 NOTE — TELEPHONE ENCOUNTER
Brooks, patient's daughter-in-law, states that patient was placed on several medications while in hospital recently.  Patient did not have PCP when admitted hospital, but has one now.  He could not get in to see PCP until September 2019.  Patient saw Dr. Victor on 7-24-19 and he agreed to refill all of his medications until he could get in to see PCP.  Spoke with Aury Fuchs MA and she states that this is accurate.

## 2019-07-29 NOTE — PROGRESS NOTES
[facility]     Education for Administration of Chemotherapy and/or Biotherapy     07/29/19    Fernie Ramos  [id number]    Mr.Lawrence KIMI Ramos is here today for education on their upcoming Chemotherapy and/or Biotherapy.     I will be going over their treatment options, obtain signed consent and answer any questions that they may have in regards to the administration of Keytruda, Carboplatin, Taxol.    Fernie Ramos has already consulted with Dr. Victor for the treatment of Stage IV Lung Cancer with metastasis. The provider has gone over the same treatment options with the patient and answered their question prior to today's visit.   The goal of the treatment is to:    [] Cure my cancer - means treatment that kills cancer cells to the point my doctor                                     cannot find them in my body and they will not grow back.    [] Control my cancer - means treatment that keeps cancer from spreading or growing.    [x] Relieve my cancer symptoms - means treatment that helps problems such as pain or                                     pressure.     This treatment has been explained to Fernie Ramos. Alternative methods of treatment, if any, have been explained to Fernie Ramos as have the benefits and risks of each. Based on the physician's explanation of the benefits and risks of this treatment and any alternatives available, The patient agrees that the potential benefit's out weighs the risks involved. I have explained to the patient the most likely complications that might occur from this treatment. The patient understands that along with the treatment additional medications may be necessary to lesson the side effects. Possible side effect may include but are not limited to, any of the following, or a combination of the following:      Allergic Reaction Vision/Eye Changes Sexual Effects    []  High Blood Pressure  []  Skin and nail changes  []  Menopausal symptoms    []  Hearing Loss []  Ulceration at injection site []  Menstrual irregularities   [x]  Fatigue []  Skin rash   []  Fertility effects   [x]  Constipation  [x]  Diarrhea [x]  Hair loss  [x]  Light/temperature sensitivity []  Heart damage  []  Liver damage   [x]  Loss of appetite [x]  Dizziness []  Lung damage   [x]  Mouth Sores [x]  Muscle aching or weakness [x]  Kidney damage   []  Taste Changes []  Forgetfulness [x]  Nerve damage   [x]  Nausea or Vomiting []  Risk of blood clots []  Weight gain/loss   []  Secondary malignancies [x]  Risk of anemia  []  Risk of bleeding/bruising      While receiving treatment, it has been explained to the patient with regards to their blood counts. This may include but not limited to CBC, Neutropenia ,Anemia, Thrombocytopenia. This handout has been explained and given to the patient.     It was explained to the patient about nutrition and how important it is while undergoing Chemotherapy and/or Biotherapy. Certain medications will be prescribed during the treatment which may change the way foods taste or smell. These changes may cause poor or no appetite. Food is fuel for your body, and if it does not get the fuel it needs, your body may become mal-nourished, which can lead to sever fatigue.It was discussed with the patient about calories and how to add high-calorie foods to their diet.  Protein was also mentioned in regards to how this will help make new cells for the body. Information was given to Fernie Ramos in regards to some good protein sources.   We also discussed with the patient how important it was to drink/eat every 2-3 hours while awake. We discussed fluid intake of at least 6 8 ounce glassed of liquids per day to stay hydrated. Some of those are listed below:     Water  Juice (fruit or vegetable)  Soda Sport Drinks Soup   Milk  Ensure, Boost, Glucerna Ice Cream Popsicles Jello   Milkshakes Pudding  Gatorade Sherbert Yogurt     It has been discussed with the patient  the risks of becoming pregnant while receiving Chemotherapy and/or Biotherapy. We also discussed the importance of using reliable barrier methods while participating in intimate activities as this may expose their partners to a potentially harmful drug.     Further home instructions were given to the patient in regards to symptoms, treatment and how to handle those situations as well as when to contact the treatment team or the providers office.     Fernie Ramos was given handouts on:   1. Complete Blood Counts and terminology  2. Nutrition during Cancer Therapy   3. Home Instructions  4. Handouts from [employer] on Keytruda, Carbo, Taxol  5. Calendar given for first chemo appt on 8/9/19 at 0800    I have discussed and gone over the full consent with the patient and answered all their questions regarding the medication they are to receive.  Written information has been provided and reviewed with Fernie Ramos. The patient and their family had a chance to ask any questions about the treatment medications and are satisfied with the information that was provided to them.     The patient has read and completed the consent form. They understand the possible risks and benefits of the recommended treatment plan and voluntarily agree to undergo the planned treatment. Should they change their mind and decide to stop treatment at any time, they will notify the providers office.       Santa Seo RN  07/29/2019  5:06 PM

## 2019-07-29 NOTE — PROGRESS NOTES
Oncology Social Work   Supportive Oncology Services    Fernie Ramos  [address]  [place] [address]  1960  [phone]    Chief Complaint: OSW met with patient and his son, Brandyn for OSW consult in conjunction with treatment planning.     History Present Illness:     Fernie Ramos is a 58 y.o. male who was recently diagnosed with stage IV lung cancer. He talked about his diagnosis and described his shock when he was first told of this; he denies distress at this time. He talked about how blessed he is with two children and two grandchildren. Supportive care provided.     Transportation: Patient does not reside in the [address]; he does not have insurance that will pay for transportation. Patient is no longer able to drive, has reliable transportation, and has funds to pay for insurance and fuel should his vehicle need to be used. His son, daughter and daughter-in-law are able to get to medical appointments. No anticipated transportation needs at this time.     Financial: Patient moved to his son's home. He said he sold many of his things. Basic needs in Brandyn's home are met. Fernie said his only debt is his car payment. He is on [employer] disability at this time, and he has access to LT disability. Discussed with them filing for SSD. He said he will have access to his insurance for the next year.     Social History  Marital Status:   Children: Yes, he has one son Brandyn who is  to Brooks. He has a daughter, Luz. Both are supportive. He has two grandchildren ages 5 and 3.   Support Community: He relies on his two children and daughter-in-law. He said he has friends who have offered to help if needed but to date has not needed to rely on them for needs.   Career: He is on medical leave from [employer]. 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   Tobacco Use: former smoker. .  Alcohol Use: does not drink  Marijuana/ Other drug Use:  Marijuana use in the past; denies currently.     Medical History  Psychiatric History: He denies any psychiatric history.     Review of Systems:   Review of Systems   Psychiatric/Behavioral: Negative for agitation, behavioral problems, confusion and suicidal ideas.     Mental Status Exam  Appearance:  clean and casually dressed, appropriate  Attitude toward clinician:  cooperative and agreeable   Speech:    Rate:  regular rate and rhythm   Volume:  normal  Motor:  no abnormal movements present, he states his right foot drags as he is not able to pick it up. He has had times where he has fallen against walls or furniture. He does not use any walking aids at this time but does have them.   Mood:  Mood and affect congruent   Affect:  mood congruent  Thought Processes:  linear, logical, and goal directed  Thought Content:  normal  Suicidal Thoughts:  absent  Homicidal Thoughts:  absent  Perceptual Disturbance: no perceptual disturbance  Attention and Concentration:  good  Insight and Judgement:  good  Memory:  memory appears to be intact    Physical Exam:   Patient is able to do his own ADLs. His son and daughter-in-law assist with IADLs.      Assessment / Plan:     Patient was given the following resources:   · distress booklet   · support group information  · community resources  · transportation resources.   Distress tool offered. Advanced Directive booklet given for completion. All questions answered to satisfaction. OSW will remain available.       Anika Thompson LCSW, OSW-C

## 2019-08-01 NOTE — DISCHARGE INSTRUCTIONS
A responsible adult should stay with you and you should rest quietly for the rest of the day. Do not drink alcohol, drive or cook for 24 hours following your procedure.  Progress your diet as tolerated.  Resume your usually medications including aspirin.  Do not pick or rub aggressively at your site.  You will be called with a follow up appointment to check your site. Your port is ready to use. No lifting more that 10 pounds for 48 hours.  If severe pain, increased shortness of air or racing heartbeat occur, seek immediate medical attention.  Follow up with Dr. Victor for results.

## 2019-08-01 NOTE — NURSING NOTE
Dr. Helm at bedside explaining port placement procedure to patient and his family members along with risks.

## 2019-08-01 NOTE — NURSING NOTE
Pt. Right neck and chest prepped in sterile fashion using chlorhexidine scrub and then draped in sterile fashion.

## 2019-08-01 NOTE — NURSING NOTE
Pt. Moved self back onto stretcher. Pt. Remains on heart monitor. Pt is back on room air. Pt. Is slightly drowsy, but easily arousable. Pt. Is oriented x3.

## 2019-08-01 NOTE — NURSING NOTE
Right chest incision is being sutured closed by Dr. Helm and flouroscopy image taken by Dr. Helm.

## 2019-08-01 NOTE — NURSING NOTE
Dr. Helm has successful access to pt's. Right IJ vein; procedure continues. Dr. Helm is numbing area on right chest.

## 2019-08-01 NOTE — NURSING NOTE
Pt brought into IR procedure room and moved self from stretcher to IR exam table. Pt. Remains on heart monitor. Pt. Placed on 3L oxygen via N/C. Pt provided warm blankets for comfort.

## 2019-08-01 NOTE — NURSING NOTE
Suturing completed and local dermabond to be applied to right neck puncture site and right chest port pocket incision.

## 2019-08-01 NOTE — NURSING NOTE
Dr. Helm is numbing area on right neck to obtain access to pt'.s right IJ vein using ultrasound guidance.

## 2019-08-07 NOTE — TELEPHONE ENCOUNTER
Pt's daughter in law Brooks called requesting a refill on pt's Hydrocodone 5/325 1 po q 4 hours prn pain.  Last filled on 7/25/19

## 2019-08-07 NOTE — TELEPHONE ENCOUNTER
Aury,     Will you run an INSPECT at your convenience?     Electronically signed by BRYANT Hopson, 08/07/19, 5:35 PM.

## 2019-08-09 NOTE — PROGRESS NOTES
Pt here for treatment,  ,  Pt states has been eating/drinking.  Pt anxious,  Pt with fall two days ago and reports stumbled,   Did not hit head,  Reviewed with Shaista Mclaughlin NP and will give pt ativan.  Ativan administered as ordered and pt reports feeling much calmer and more relaxed,  Per LAMIN Mclaughlin NP if pt has further falls, will need PT.  Reviewed with pt and son and v/u and agreement.

## 2019-08-13 NOTE — TELEPHONE ENCOUNTER
Vonda from \[facility]\"" called and states that the joseph had reached out to them and they were needing orders to evaluate the pt. I spoke NC and then called back and gave the verbal order.

## 2019-08-15 NOTE — TELEPHONE ENCOUNTER
Dr. Solo Gage called and states that the patient is starting palliative care and still getting chemo and radiation. They are prescribing MS contin and MS IR. I made Shaista Mclaughlin aware and she discontinued her order for MS Contin and hydrocodone. I called and spoke to albaro to make them aware as well.

## 2019-08-21 NOTE — TELEPHONE ENCOUNTER
PER ROGER AT [facility] NARAYAN NOT FIND PT BY NAME TO MATCH  OR SS#  REFER TO ORDER FOR COLON DATED 19

## 2019-08-21 NOTE — PROGRESS NOTES
"                 Nutrition Assessment  Fernie Ramos    Anthropometrics  Height: 5'8\"  Weight: 138.6# (8/20/19)  BMI: 21.1  Weight Hx:  (5/8/18)169#  (6/23/19) 151.4#  (7/8/19) 143.5#  (7/24/19) 141.5#  (8/9/19) 141#  IBW: 154# (90%)    Nutrition Questionnaire    Appetite: Better; it was poor for several days post chemo tx    24-Hour Diet Recall:   Breakfast - [employer]iac lemon protein muffin made with fat-free carb-master milk, whole eggs, butter, steven x2, carb-master milk  Lunch - leftover chicken or steak  Dinner - BW3s: entire appetizer platter, 6 chicken wings, sweet tea (celebratory dinner for last day of rt tx)  Snacks: uncrustable, protein drink (pasteurized egg whites mixed with protein powder)    Discussion: Called pt to address recent weight loss, he's unintentionally lost 12.8# (8.4%) in the past two months. Pt napping during time of call, I spoke to his son, Brandyn. We discussed ways to increase his dad's caloric intake. Pt lives with his son and his son and daughter-in-law do all the cooking and shopping. They have been feeding him and doing everything they know to do to help him get adequate calories, praised his son for his efforts and encouraged him to continue. Pt has a protein drink daily made with egg whites, his son said it's similar in taste and consistency to brownie batter and they really enjoy it. I recommended they switch to regular Egg Beaters (cholesterol free) for more calories and add other ingredients including whole milk and peanut butter, pt's son said he'll do this. I also suggested that they switch all his dairy products to whole regular milk rather than non-fat carb-controlled, pt's son said he'd comply. I recommended they use Fair Life whole milk because it'll be similar in taste and digestibility as carb-master, but with more protein and calories. We discussed various high-calorie snacks to keep on hand, and how to make their staple items more calorie-dense. Pt's son was receptive " and appreciated the guidance. All questions and concerns were addressed and answered. I will f/u with pt during next treatment.    Abdulaziz Gregorio, MS,RD,LD-[place],CD-[place]  Registered Dietitian

## 2019-08-22 PROBLEM — K63.89 COLONIC MASS: Status: ACTIVE | Noted: 2019-01-01

## 2019-08-22 PROBLEM — L30.9 DERMATITIS: Status: ACTIVE | Noted: 2019-01-01

## 2019-08-22 PROBLEM — R11.0 CHEMOTHERAPY-INDUCED NAUSEA: Status: ACTIVE | Noted: 2019-01-01

## 2019-08-22 PROBLEM — T45.1X5A CHEMOTHERAPY-INDUCED NAUSEA: Status: ACTIVE | Noted: 2019-01-01

## 2019-08-22 PROBLEM — B37.0 ORAL THRUSH: Status: ACTIVE | Noted: 2019-01-01

## 2019-08-22 PROBLEM — C78.01: Status: ACTIVE | Noted: 2019-01-01

## 2019-08-22 NOTE — PROGRESS NOTES
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  · This  male smoker who was hospitalized at [facility] between 19 and 19 after having presented to [facility] with gradual onset of increasing confusion and memory difficulty.  Work-up at at [facility] ER had included CT head without contrast revealing partially calcified, partially cystic mass lesion in the left parietal lobe with mass-effect and mild midline shift from left to right by 4 mm.  Chest x-ray had abnormal right upper lung with dense opacity and left basilar 11 mm pulmonary nodule.  The patient was transferred to [facility] and underwent CT head with and without contrast revealing a complex cystic and solid mass in the left parietal lobe.  MRI brain with and without contrast revealed 5.3 x 4.8 x 5.2 peripherally contrast-enhancing mass centered within the left parietal lobe with extension to the left occipital lobe with circumscribing vasogenic edema resulting in mass-effect with midline shift to the right by approximately 5 mm.  CT CAP with contrast reveals approximately 7 cm necrotic appearing right upper lobe lung mass obstructing the right upper lobe bronchus with extensive metastatic disease.  There was lymphadenopathy throughout the mediastinum and both sven and there were multiple bilateral pulmonary metastasis.  There was an endobronchial component within the right mainstem bronchus and there was also narrowing of the right middle and lower lobe bronchi.  Significant circumferentially thickened appearance of the mid and  distal esophagus and approximately 3.4 cm polypoid mass within the descending colon was seen.  His transaminases were normal and creatinine 0.7.  He underwent left parieto-occipital craniotomy for tumor resection by Jarred Fischer MD with pathology revealing metastatic poorly differentiated squamous cell carcinoma with extensive necrosis and 2 specimens.  PD-L1 was positive with TPS 40%.  He was seen in consultation by medical oncology with plans of combined modality chemo immune therapy post completion of radiation therapy.  Post surgery MRI of the brain with and without contrast revealed a 3.4 x 2.8 x 2.9 cm postsurgical defect and a resection cavity in the central to inferior aspect of the mass with a moderate amount of residual heterogeneously enhancing tumor along the superior margin of the resection cavity that measured up to 3.2 x 3.9 x 1.7 cm in medial lateral, anterior posterior, and craniocaudal dimension with a thinner drained of residual enhancing tumor along the anterior and lateral posterior superior medial margins of the resection cavity.  There was stable patchy surrounding FLAIR and T2 hyperintensity along the margins of the resection cavity and residual enhancing tumor which along the anterior margin was most prominent and tracking up to 4.2 x 2.2 cm compatible with residual stable surrounding pathogenic edema or infiltrating nonenhancing tumor.  There was stable mass-effect and 45 mm left to right midline shift at the level of the superior falx and superior septum pellucidum.  Scattered small nodular foci of T2 high signal in the cerebral white matter consistent with mild small vessel disease were seen.  The patient had a PET scan performed on 7/8/19 revealing large cavitary right upper lobe mass obstructing the right upper lobe bronchus and appearing to invade the mediastinum.  Diffuse pulmonary metastatic disease was seen.  Extensive bilateral hilar, mediastinal, and supraclavicular adenopathy  was present.  Asymmetric increased uptake within the right vocal cord and abnormal area of focal increased uptake near the splenic flexure of the colon were seen.  The patient lives in [place] [facility] and decided to take his treatments in [facility].  He was seen in consultation by Ari Hernández MD on 7/10/19 with the recommendation of palliative external beam radiation therapy to chest plus/minus concurrent systemic therapy in order to prevent collapse of the lung.  For the brain plan was to reimage the brain in 1 month postop to see if there is decrease in the size of the surgical cavity and if there is no other lesion at that time then would proceed with fractionated stereotactic radiosurgery.  If there are multiple brain metastasis then would treat with whole brain radiation therapy.  · 7/24/2019 - patient seen in initial consultation at the cancer center complaining of weakness, cough productive of clear sputum, shortness of air on exertion.  He complained of frontal chest wall without pain 5/10 radiating up to the head better with breathing treatments and pain medications.  He gave a history of 40 pound weight loss over 3 a year period.  The case was discussed with Dr. Hernández and it was recommended by me to treat the patient with a combination of carboplatin AUC 6, paclitaxel 200 mg/m² and pembrolizumab 200 mg every 21 days for 4 cycles followed by continuation of pembrolizumab to complete a 1 year course.  Neulasta support added. We may even start the pembrolizumab during the radiation part of the treatment.  · 7/29-8/20/2019-patient received radiation to right upper lobe lung mass.  Received 15 fractions, 250 CG Y per fraction.  Toxicity included dermatitis and nausea.  · 8/1/19 - Right chest wall Infusaport placed by Dr. Helm in [facility] IR.   · 8/9/19 - TSH 1.110 (0.340-5.600), free T4 1.44 (0.58-1.64).   (L).  Treated with Ativan 0.5 mg IV for anxiety prior to chemotherapy.  Patient reported  adverse reaction to Ativan causing worsening of anxiety.  Cycle 1 chemotherapy given.  · 8/13/19- [facility] referral made per patient and family request.  · 8/15/19- received notification that patient was enrolled with palliative care under the treatment of Dr. JOSE York.  Pain treated with MS Contin and MS immediate release.  · 8/21/2019- nutrition assessment by dietitian.  Ideal body weight 154 pounds.  Weight has been stable for over 1 month.  · 8/22/2019-patient reports diarrhea, nausea weakness and confusion for 1 week following cycle 1 chemotherapy.  Zyprexa 10 mg p.o. day 1 through 3 with each cycle of chemotherapy.  Phenergan 12.5 mg IV added today 1 chemotherapy.  Mycelex troches 3 times daily for oral thrush prescribed.    Past Medical History:   Diagnosis Date   • Bronchitis    • Hyperlipidemia    • Malignant neoplasm of right lung (CMS/HCC) 2019   • Neoplasm of brain (CMS/HCC) 2019   • Pneumonia        Past Surgical History:   Procedure Laterality Date   • BACK SURGERY  2007    low   • CRANIOTOMY FOR TUMOR Left 6/26/2019    Procedure: Left parieto-occipital craniotomy for tumor;  Surgeon: Jarred Fischer MD;  Location: [facility];  Service: Neurosurgery         Current Outpatient Medications:   •  benzonatate (TESSALON) 200 MG capsule, Take 1 capsule by mouth 3 (Three) Times a Day As Needed for Cough., Disp: 90 capsule, Rfl: 1  •  dexamethasone (DECADRON) 4 MG tablet, Take 1 tablet by mouth Every 12 (Twelve) Hours., Disp: 60 tablet, Rfl: 0  •  Doxylamine-DM (VICKS DAYQUIL/NYQUIL COUGH PO), Take  by mouth., Disp: , Rfl:   •  fluconazole (DIFLUCAN) 100 MG tablet, Take 2 tablets by mouth Daily. (Patient taking differently: Take 200 mg by mouth As Needed.), Disp: 5 tablet, Rfl: 0  •  Insulin Lispro (HUMALOG KWIKPEN) 100 UNIT/ML solution pen-injector, For blood sugar 200 to 250- 4 units, 251-300- 8u, 301 to 350- 12 u.,  351 to 400- 16u.,  401 to 450- 20U.,  Greater than 451 call PCP, Disp: 1 pen, Rfl:  1  •  ipratropium-albuterol (DUO-NEB) 0.5-2.5 mg/3 ml nebulizer, Take 3 mL by nebulization 4 (Four) Times a Day., Disp: 360 mL, Rfl: 3  •  levETIRAcetam (KEPPRA) 500 MG tablet, Take 1 tablet by mouth Every 12 (Twelve) Hours., Disp: 60 tablet, Rfl: 0  •  lidocaine-prilocaine (EMLA) 2.5-2.5 % cream, Apply  topically to the appropriate area as directed Every 2 (Two) Hours As Needed (port use)., Disp: 30 g, Rfl: 1  •  metFORMIN (GLUCOPHAGE) 500 MG tablet, Take 1 tablet by mouth 2 (Two) Times a Day With Meals., Disp: 60 tablet, Rfl: 1  •  Morphine (MS CONTIN) 15 MG 12 hr tablet, , Disp: , Rfl:   •  Nystatin (MAGIC MOUTHWASH), Swish and spit 5 mL Every 4 (Four) Hours As Needed (oral thrush or mucositis)., Disp: , Rfl:   •  ondansetron (ZOFRAN) 8 MG tablet, Take 1 tablet by mouth Every 8 (Eight) Hours As Needed for Nausea or Vomiting., Disp: 30 tablet, Rfl: 2  •  promethazine (PHENERGAN) 12.5 MG tablet, Take 1 tablet by mouth Every 6 (Six) Hours As Needed for Nausea or Vomiting., Disp: 30 tablet, Rfl: 2  •  raNITIdine (ZANTAC) 150 MG tablet, Take 150 mg by mouth Daily., Disp: , Rfl:   •  sennosides-docusate sodium (SENOKOT-S) 8.6-50 MG tablet, Take 2 tablets by mouth Every Night., Disp: 60 tablet, Rfl: 0  •  clotrimazole (MYCELEX) 10 MG jim, Take 1 tablet by mouth 3 (Three) Times a Day., Disp: 45 tablet, Rfl: 3  •  guaifenesin-dextromethorphan (ROBITUSSIN DM) 100-10 MG/5ML syrup, Take 5 mL by mouth Every 4 (Four) Hours As Needed for Cough., Disp: 1000 mL, Rfl: 0  •  nicotine (NICODERM CQ) 21 MG/24HR patch, Place 1 patch on the skin as directed by provider Daily., Disp: 21 patch, Rfl: 0  •  OLANZapine (ZYPREXA) 10 MG tablet, Take 1 tablet by mouth Daily for 3 days. Take 1 tablet Day 1-3 with each cycle of chemo (every 3 weeks)., Disp: 6 tablet, Rfl: 3    No Known Allergies    Family History   Problem Relation Age of Onset   • Lung cancer Father 70       Cancer-related family history includes Lung cancer (age of onset:  70) in his father.    Social History     Tobacco Use   • Smoking status: Former Smoker     Packs/day: 1.00     Types: Cigarettes     Start date:      Last attempt to quit: 2019     Years since quittin.6   • Smokeless tobacco: Never Used   Substance Use Topics   • Alcohol use: No     Frequency: Never   • Drug use: Yes     Types: Marijuana     Comment: denies use for years       I have reviewed the history of present illness, past medical history, family history, social history, lab results, all notes and other records since the patient was last seen on 19.     SUBJECTIVE: Patient is here for follow up of lung cancer with brain mets.  He reports he had nausea, diarrhea and confusion following cycle 1 chemotherapy, he fell twice in the yard.  He took Zofran and Phenergan but did not like the way the Phenergan made him feel.  He is feeling better now.  He is eating and the diarrhea is gone.  He has a raspy voice which he says may be due to the radiation, because the radiation may have affected his vocal cords according to Dr. Hernández.  He has a cough and some shortness of air but his sputum is clear.  He is wondering if it is worth trying the chemotherapy to treat his disease or if the chemotherapy is just going to make him feel worse than he already does.    BRYANT Rahman present during office visit.         REVIEW OF SYSTEMS:  Review of Systems   Constitutional: Negative for chills, diaphoresis, fatigue, fever and unexpected weight change.   HENT: Positive for voice change. Negative for congestion, ear pain, mouth sores, nosebleeds and sore throat.    Eyes: Negative.  Negative for photophobia and visual disturbance.   Respiratory: Positive for cough and shortness of breath. Negative for wheezing and stridor.    Cardiovascular: Negative for chest pain, palpitations and leg swelling.   Gastrointestinal: Positive for nausea. Negative for abdominal pain, blood in stool, constipation, diarrhea and vomiting.  "  Endocrine: Negative for cold intolerance and heat intolerance.   Genitourinary: Negative for dysuria and hematuria.   Musculoskeletal: Positive for arthralgias (Pain in his legs) and back pain. Negative for joint swelling and neck stiffness.        Pain is controlled on morphine.   Skin: Positive for rash (His hands peel, but they did so prior to him getting lung cancer.  They have peeled for years.). Negative for color change and wound.   Allergic/Immunologic: Negative.    Neurological: Negative for seizures, syncope, numbness and headaches.   Hematological: Negative for adenopathy. Does not bruise/bleed easily.        No obvious bleeding   Psychiatric/Behavioral: Positive for sleep disturbance. Negative for agitation, confusion and hallucinations. The patient is nervous/anxious.    All other systems reviewed and are negative.      OBJECTIVE:    Vitals:    08/22/19 1341   BP: 123/72   Pulse: 98   Resp: 20   Temp: 97.8 °F (36.6 °C)   Weight: 64 kg (141 lb)   Height: 175.3 cm (69\")   PainSc: 0-No pain       ECOG  (2) Ambulatory and capable of self care, unable to carry out work activity, up and about > 50% or waking hours    Physical Exam   Constitutional: He is oriented to person, place, and time. He appears well-developed and well-nourished.   Thin.  Son and daughter-in-law present.   HENT:   Head: Normocephalic and atraumatic.   Mild oral thrush.  Poor dentition.   Eyes: Conjunctivae, EOM and lids are normal. Pupils are equal, round, and reactive to light.   Mild scleral erythema.   Neck: Normal range of motion. Neck supple. No thyromegaly present.   Cardiovascular: Normal rate, regular rhythm and normal heart sounds.   No murmur heard.  Pulmonary/Chest: He is in respiratory distress (Conversational dyspnea).   Bilateral breath sounds decreased in bases.   Abdominal: Soft. Normal appearance and bowel sounds are normal. He exhibits no distension.   Flat   Genitourinary:   Genitourinary Comments: Deferred. "   Musculoskeletal: Normal range of motion. He exhibits no edema.   Lymphadenopathy:     He has no cervical adenopathy.     He has no axillary adenopathy.        Right: No supraclavicular adenopathy present.        Left: No supraclavicular adenopathy present.   Neurological: He is alert and oriented to person, place, and time.   Skin: Skin is warm and dry. Capillary refill takes less than 2 seconds. Rash (Erythematous diffuse redness, sandpaper rash with some peeling of her right upper chest wall.  Bilateral fingers and hands with rough erythematous rash with areas of peeling.  Nail changes.  Clubbing of fingers.) noted. No bruising and no petechiae noted.   Right chest wall port normal   Psychiatric: He has a normal mood and affect. His speech is normal and behavior is normal. Judgment and thought content normal. Cognition and memory are normal.   Anxious, poor eye contact   Nursing note and vitals reviewed.      RECENT LABS  WBC   Date Value Ref Range Status   08/22/2019 16.10 (H) 3.40 - 10.80 10*3/mm3 Final     RBC   Date Value Ref Range Status   08/22/2019 4.27 4.14 - 5.80 10*6/mm3 Final     Hemoglobin   Date Value Ref Range Status   08/22/2019 12.3 (L) 13.0 - 17.7 g/dL Final     Hematocrit   Date Value Ref Range Status   08/22/2019 38.5 37.5 - 51.0 % Final     MCV   Date Value Ref Range Status   08/22/2019 90.2 79.0 - 97.0 fL Final     MCH   Date Value Ref Range Status   08/22/2019 28.8 26.6 - 33.0 pg Final     MCHC   Date Value Ref Range Status   08/22/2019 31.9 31.5 - 35.7 g/dL Final     RDW   Date Value Ref Range Status   08/22/2019 15.6 (H) 12.3 - 15.4 % Final     RDW-SD   Date Value Ref Range Status   08/22/2019 49.7 37.0 - 54.0 fl Final     MPV   Date Value Ref Range Status   08/22/2019 7.9 6.0 - 12.0 fL Final     Platelets   Date Value Ref Range Status   08/22/2019 209 140 - 450 10*3/mm3 Final     Neutrophil %   Date Value Ref Range Status   08/22/2019 94.8 (H) 42.7 - 76.0 % Final     Lymphocyte %   Date  Value Ref Range Status   08/22/2019 2.4 (L) 19.6 - 45.3 % Final     Monocyte %   Date Value Ref Range Status   08/22/2019 2.4 (L) 5.0 - 12.0 % Final     Eosinophil %   Date Value Ref Range Status   08/22/2019 0.0 (L) 0.3 - 6.2 % Final     Basophil %   Date Value Ref Range Status   08/22/2019 0.4 0.0 - 1.5 % Final     Immature Grans %   Date Value Ref Range Status   07/08/2019 2.0 (H) 0.0 - 0.5 % Final     Neutrophils, Absolute   Date Value Ref Range Status   08/22/2019 15.28 (H) 1.70 - 7.00 10*3/mm3 Final     Lymphocytes, Absolute   Date Value Ref Range Status   08/22/2019 0.38 (L) 0.70 - 3.10 10*3/mm3 Final     Monocytes, Absolute   Date Value Ref Range Status   08/22/2019 0.38 0.10 - 0.90 10*3/mm3 Final     Eosinophils, Absolute   Date Value Ref Range Status   08/22/2019 0.00 0.00 - 0.40 10*3/mm3 Final     Basophils, Absolute   Date Value Ref Range Status   08/22/2019 0.06 0.00 - 0.20 10*3/mm3 Final     Immature Grans, Absolute   Date Value Ref Range Status   07/08/2019 0.29 (H) 0.00 - 0.05 10*3/mm3 Final     nRBC   Date Value Ref Range Status   07/08/2019 0.0 0.0 - 0.2 /100 WBC Final       Lab Results   Component Value Date    GLUCOSE 159 (H) 08/09/2019    BUN 14 08/09/2019    CREATININE 0.40 (L) 08/09/2019    EGFRIFNONA 138 08/09/2019    BCR 23.3 (H) 08/09/2019    K 3.8 08/09/2019    CO2 23.0 08/09/2019    CALCIUM 8.3 (L) 08/09/2019    ALBUMIN 2.40 (L) 08/09/2019    AST 15 08/09/2019    ALT 18 08/09/2019         Assessment/Plan     Metastatic squamous cell carcinoma to lung, right (CMS/HCC)  - CBC & Differential  - CBC Auto Differential  - MRI Brain With Contrast  - CBC and Differential  - Comprehensive metabolic panel    Colonic mass    Metastatic cancer to brain (CMS/HCC)    Dermatitis  - JALYN Comprehensive Panel    Oral thrush    Chemotherapy-induced nausea      ASSESSMENT:  1. Stage IV squamous cell carcinoma of the right upper lobe lung with metastasis to the brain- s/p craniotomy for tumor resection, s/p  radiation to right upper lung.  Completed cycle 1 chemotherapy- tolerated with nausea, diarrhea and weakness.  Answered multiple questions about when to anticipate a response to current chemotherapy, is it likely that he will respond to treatment and is it worth the side effects for the desired response.  Recommended that his decision to proceed with further chemotherapy is his decision alone and his decision will be fully supported.  Explained response rate to current treatment is 40 to 50%, half of patients respond and half of the patients will progress despite treatment.  Restaging CT scans will be done after 3 cycles.  Encouraged him to proceed with at least 3 cycles, if his desire is to have improvement and control of his disease.  At that point, a decision to proceed or stop treatment could be made.  If he does not want further treatment, he can let us know anytime prior to his next cycle.  Explained that at this point a prediction of whether he will respond to treatment is possible but that possible because he is only received 1 cycle of treatment.  Brain MRI ordered with plan to proceed with either stereotactic or whole brain radiation depending on results.  Continue dexamethasone 4 mg twice daily.  Continue current chemotherapy.  Zyprexa 10 mg p.o. day 1 through 3 and Phenergan 12.5 mg IV added to day 1 chemotherapy regimen for nausea.  2. Dermatitis-he has radiation-induced dermatitis on right chest wall and erythematous rash and desquamation of his hands.  Instructed to utilize Aquaphor liberally.  JALYN ordered.  3. Chemotherapy induced nausea-add Zyprexa and Phenergan to chemotherapy regimen.  4. Oral thrush-Mycelex troches 3 times daily  5. Chronic pain for malignancy- [facility]  6. Colon mass-he has not scheduled his colonoscopy yet.  A symptomatic.    PLAN:  Continue dexamethasone-taper per radiation oncology  JALYN  Brain MRI  Aquaphor to chest wall and hands  liberally  Continue chemotherapy  Mycelex troches 3 times daily  Pain management per hospice  Zyprexa 10 mg p.o. day 1 through 3 each chemotherapy cycle  Phenergan 12.5 mill grams IV day 1 chemotherapy       I have reviewed labs results, imaging, vitals, and medications with the patient today. Will follow up in .     Patient verbalized understanding and is in agreement of the above plan.    Electronically signed by BRYANT Fish, 08/22/19, 4:05 PM.    I have personally performed a face-to-face diagnostic evaluation on this patient.  I have discussed the case with Deyanira Newsome, NP, have edited/reviewed the note,  and agree with the care plan.  Though patient somewhat confused family accompanying him suggest that he suffered from weakness, nausea and vomiting for 10 days postchemotherapy.  On examination he has decreased breath sounds and his labs are significant for leukocytosis likely secondary to Neulasta use.  Made him aware that side effects can be controlled better if he had made us aware in a timely fashion.  Patient unsure about whether he wants to proceed with further chemotherapy and told him that we will follow his wishes.  If he proceeds with chemotherapy will make adjustments to the antiemetic schedule.      Edson Victor M.D., F.A.C.P.    Much of the above report is an electronic transcription/translation of the spoken language to printed text using Dragon Software. As such, the subtleties and finesse of the spoken language may permit erroneous, or at times, nonsensical words or phrases to be inadvertently transcribed; thus changes may be made at a later date to rectify these errors.

## 2019-08-26 NOTE — TELEPHONE ENCOUNTER
What a mess.  Ok, then just have him follow up with Johnny Fischer in the next 2wks but make sure Amado knows that he has NOT had brain XRT.    Thanks, Mickie

## 2019-08-26 NOTE — TELEPHONE ENCOUNTER
Called patient and patients son to touch basis on when he would be starting brain radiation.  No answer left vm's.       I called Dr. Hernández's office talked to Jacquie to see if he was scheduled for brain radiation.  She said he has completed the 15 treatments of lung radiation. She does not see any documentation on him starting brain radiation or going to start brain radiation. MRI brain was ordered 8/23/19 by Dr. Hernández.

## 2019-08-27 NOTE — TELEPHONE ENCOUNTER
Patient's daughter requesting refill on Keppra, Metformin, Tessalon Pearls and Dexamethasone.  Patient with Stage IV squamous cell carcinoma of right upper lobe lung with metastasis to brain.  Patient scheduled [facility] Dr[facility] ho [facility] 9[facility]10-19[facility]      Note from 7-25-19:  Brooks, patient's daughter-in-law, states that patient was placed on several medications while in hospital recently.  Patient did not have PCP when admitted hospital, but has one now.  He could not get in to see PCP until September 2019.  Patient saw Dr. Ho on 7-24-19 and he agreed to refill all of his medications until he could get in to see PCP.  Spoke with Aury Fuchs MA and she states that this is accurate.

## 2019-08-28 NOTE — TELEPHONE ENCOUNTER
I spoke to Brandyn jones son. He completed lung radiation and he said that he was unsure if his dad will choose to do brain radiation.  He has a MRI brain scheduled for 8/30/19 ordered by Dr. Paul at [facility] and they will determine then.       He has been scheduled for follow up with Dr. Fischer.

## 2019-09-09 NOTE — PROGRESS NOTES
Subjective   Patient ID: Fernie Ramos is a 58 y.o. male is here today for follow-up. He is 2 months and 14 days out from a Left parieto-occipital craniotomy with removal of a left parieto-occipital metastasis done on 6/26/2019. He has been getting radiation at [facility]. He is no longer doing radiation or chemo he is now in hospice care. Patient denies any headaches, dizziness, vision changes or nausea.      History of Present Illness    This patient returns today.  He is doing pretty well overall.  He is totally awake alert and oriented.  He has decided to withdraw and to hospice.  He finished the radiation on his chest but the chemotherapy was making him too sick and he stopped it.  I did tell him to talk to the radiation doctors about doing the radiation to his brain as I do not believe it is very morbid and may buy him some further quality time.    The following portions of the patient's history were reviewed and updated as appropriate: allergies, current medications, past family history, past medical history, past social history, past surgical history and problem list.    Review of Systems   Eyes: Negative for visual disturbance.   Respiratory: Negative for chest tightness and shortness of breath.    Cardiovascular: Negative for chest pain.   Gastrointestinal: Negative for nausea.   Neurological: Negative for dizziness and headaches.   All other systems reviewed and are negative.      Objective   Physical Exam   Constitutional: He is oriented to person, place, and time. He appears well-developed and well-nourished.   Neurological: He is oriented to person, place, and time.     Neurologic Exam     Mental Status   Oriented to person, place, and time.       Assessment/Plan   Independent Review of Radiographic Studies:      There is no new imaging to review    Medical Decision Making:      I told the patient that there is no reason to do further imaging if he is going to pursue hospice.  He will call me if anything  changes or gets worse.    Fernie was seen today for post-op.    Diagnoses and all orders for this visit:    Surgery follow-up examination      Return if symptoms worsen or fail to improve.

## 2019-09-10 NOTE — PROGRESS NOTES
Hematology/Oncology Outpatient Follow Up    PATIENT NAME:Fernie Ramos  :1960  MRN: [id number]  PRIMARY CARE PHYSICIAN: Domenica Marks MD  REFERRING PHYSICIAN: Domenica Marks MD    No chief complaint on file.     HISTORY OF PRESENT ILLNESS:   1. 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  · This  male smoker who was hospitalized at [facility] between 19 and 19 after having presented to [facility] with gradual onset of increasing confusion and memory difficulty.  Work-up at at [facility] ER had included CT head without contrast revealing partially calcified, partially cystic mass lesion in the left parietal lobe with mass-effect and mild midline shift from left to right by 4 mm.  Chest x-ray had abnormal right upper lung with dense opacity and left basilar 11 mm pulmonary nodule.  The patient was transferred to [facility] and underwent CT head with and without contrast revealing a complex cystic and solid mass in the left parietal lobe.  MRI brain with and without contrast revealed 5.3 x 4.8 x 5.2 peripherally contrast-enhancing mass centered within the left parietal lobe with extension to the left occipital lobe with circumscribing vasogenic edema resulting in mass-effect with midline shift to the right by approximately 5 mm.  CT CAP with contrast reveals approximately 7 cm necrotic appearing right upper lobe lung mass obstructing the right upper lobe bronchus with extensive metastatic disease.  There was lymphadenopathy throughout the mediastinum and both sven and there were multiple bilateral pulmonary metastasis.  There was an endobronchial component within the right mainstem bronchus and there was also narrowing of the right middle and lower lobe bronchi.  Significant circumferentially thickened appearance of the mid and distal esophagus and approximately 3.4 cm polypoid mass within the descending colon was seen.  His transaminases were  normal and creatinine 0.7.  He underwent left parieto-occipital craniotomy for tumor resection by Jarred Fischer MD with pathology revealing metastatic poorly differentiated squamous cell carcinoma with extensive necrosis and 2 specimens.  PD-L1 was positive with TPS 40%.  He was seen in consultation by medical oncology with plans of combined modality chemo immune therapy post completion of radiation therapy.  Post surgery MRI of the brain with and without contrast revealed a 3.4 x 2.8 x 2.9 cm postsurgical defect and a resection cavity in the central to inferior aspect of the mass with a moderate amount of residual heterogeneously enhancing tumor along the superior margin of the resection cavity that measured up to 3.2 x 3.9 x 1.7 cm in medial lateral, anterior posterior, and craniocaudal dimension with a thinner drained of residual enhancing tumor along the anterior and lateral posterior superior medial margins of the resection cavity.  There was stable patchy surrounding FLAIR and T2 hyperintensity along the margins of the resection cavity and residual enhancing tumor which along the anterior margin was most prominent and tracking up to 4.2 x 2.2 cm compatible with residual stable surrounding pathogenic edema or infiltrating nonenhancing tumor.  There was stable mass-effect and 45 mm left to right midline shift at the level of the superior falx and superior septum pellucidum.  Scattered small nodular foci of T2 high signal in the cerebral white matter consistent with mild small vessel disease were seen.  The patient had a PET scan performed on 7/8/19 revealing large cavitary right upper lobe mass obstructing the right upper lobe bronchus and appearing to invade the mediastinum.  Diffuse pulmonary metastatic disease was seen.  Extensive bilateral hilar, mediastinal, and supraclavicular adenopathy was present.  Asymmetric increased uptake within the right vocal cord and abnormal area of focal increased uptake near  the splenic flexure of the colon were seen.  The patient lives in [facility] and decided to take his treatments in [facility].  He was seen in consultation by Ari Hernández MD on 7/10/19 with the recommendation of palliative external beam radiation therapy to chest plus/minus concurrent systemic therapy in order to prevent collapse of the lung.  For the brain plan was to reimage the brain in 1 month postop to see if there is decrease in the size of the surgical cavity and if there is no other lesion at that time then would proceed with fractionated stereotactic radiosurgery.  If there are multiple brain metastasis then would treat with whole brain radiation therapy.  · 7/24/2019 - patient seen in initial consultation at the cancer center complaining of weakness, cough productive of clear sputum, shortness of air on exertion.  He complained of frontal chest wall without pain 5/10 radiating up to the head better with breathing treatments and pain medications.  He gave a history of 40 pound weight loss over 3 a year period.  The case was discussed with Dr. Hernández and it was recommended by me to treat the patient with a combination of carboplatin AUC 6, paclitaxel 200 mg/m² and pembrolizumab 200 mg every 21 days for 4 cycles followed by continuation of pembrolizumab to complete a 1 year course.  Neulasta support added. We may even start the pembrolizumab during the radiation part of the treatment.  · 7/29–8/20/2019–patient received radiation to right upper lobe lung mass.  Received 15 fractions, 250 CG Y per fraction.  Toxicity included dermatitis and nausea.  · 8/1/19 - Right chest wall Infusaport placed by Dr. Helm in [facility] IR.   · 8/9/19 - TSH 1.110 (0.340-5.600), free T4 1.44 (0.58-1.64).   (L).  Treated with Ativan 0.5 mg IV for anxiety prior to chemotherapy.  Patient reported adverse reaction to Ativan causing worsening of anxiety.  Cycle 1 chemotherapy given.  · 8/13/19- [facility] referral made  per patient and family request.  · 8/15/19- received notification that patient was enrolled with palliative care under the treatment of Dr. JOSE York.  Pain treated with MS Contin and MS immediate release.  · 8/21/2019- nutrition assessment by dietitian.  Ideal body weight 154 pounds.  Weight has been stable for over 1 month.  · 8/22/2019–patient reports diarrhea, nausea weakness and confusion for 1 week following cycle 1 chemotherapy.  Zyprexa 10 mg p.o. day 1 through 3 with each cycle of chemotherapy.  Phenergan 12.5 mg IV added today 1 chemotherapy.  Mycelex troches 3 times daily for oral thrush prescribed.   · 8/30/19 - MRI brain showed large rim enhancing cystic/necrotic mass in the left parietal lobe consistent with known metastatic disease. There is vasogenic edema with effacement of the occipital horn of the left lateral ventricle and approximately 3 mm of left-to-right subfalcine shift. The basal cisterns are well maintained. The patient's had a left parietal craniotomy with expected post biopsy changes. Nonspecific periventricular white matter signal usually representing chronic microvascular ischemia.     Past Medical History:   Diagnosis Date   • Bronchitis    • Hyperlipidemia    • Malignant neoplasm of right lung (CMS/HCC) 2019   • Neoplasm of brain (CMS/HCC) 2019   • Pneumonia        Past Surgical History:   Procedure Laterality Date   • BACK SURGERY  2007    low   • CRANIOTOMY FOR TUMOR Left 6/26/2019    Procedure: Left parieto-occipital craniotomy for tumor;  Surgeon: Jarred Fischer MD;  [facility];  Service: Neurosurgery         Current Outpatient Medications:   •  benzonatate (TESSALON) 200 MG capsule, Take 1 capsule by mouth 3 (Three) Times a Day As Needed for Cough., Disp: 90 capsule, Rfl: 1  •  clotrimazole (MYCELEX) 10 MG jim, Take 1 tablet by mouth 3 (Three) Times a Day., Disp: 45 tablet, Rfl: 3  •  dexamethasone (DECADRON) 4 MG tablet, Take 1 tablet by mouth Every 12  (Twelve) Hours., Disp: 60 tablet, Rfl: 0  •  dexamethasone (DECADRON) 4 MG tablet, TAKE 1 TABLET BY MOUTH EVERY 12 HOURS, Disp: 60 tablet, Rfl: 0  •  Doxylamine-DM (VICKS DAYQUIL/NYQUIL COUGH PO), Take  by mouth., Disp: , Rfl:   •  fluconazole (DIFLUCAN) 100 MG tablet, Take 2 tablets by mouth Daily. (Patient taking differently: Take 200 mg by mouth As Needed.), Disp: 5 tablet, Rfl: 0  •  guaifenesin-dextromethorphan (ROBITUSSIN DM) 100-10 MG/5ML syrup, Take 5 mL by mouth Every 4 (Four) Hours As Needed for Cough., Disp: 1000 mL, Rfl: 0  •  Insulin Lispro (HUMALOG KWIKPEN) 100 UNIT/ML solution pen-injector, For blood sugar 200 to 250- 4 units, 251-300- 8u, 301 to 350- 12 u.,  351 to 400- 16u.,  401 to 450- 20U.,  Greater than 451 call PCP, Disp: 1 pen, Rfl: 1  •  ipratropium-albuterol (DUO-NEB) 0.5-2.5 mg/3 ml nebulizer, Take 3 mL by nebulization 4 (Four) Times a Day., Disp: 360 mL, Rfl: 3  •  levETIRAcetam (KEPPRA) 500 MG tablet, Take 1 tablet by mouth Every 12 (Twelve) Hours., Disp: 60 tablet, Rfl: 0  •  lidocaine-prilocaine (EMLA) 2.5-2.5 % cream, Apply  topically to the appropriate area as directed Every 2 (Two) Hours As Needed (port use)., Disp: 30 g, Rfl: 1  •  metFORMIN (GLUCOPHAGE) 500 MG tablet, Take 1 tablet by mouth 2 (Two) Times a Day With Meals., Disp: 60 tablet, Rfl: 1  •  Morphine (MS CONTIN) 15 MG 12 hr tablet, , Disp: , Rfl:   •  nicotine (NICODERM CQ) 21 MG/24HR patch, Place 1 patch on the skin as directed by provider Daily., Disp: 21 patch, Rfl: 0  •  Nystatin (MAGIC MOUTHWASH), Swish and spit 5 mL Every 4 (Four) Hours As Needed (oral thrush or mucositis)., Disp: , Rfl:   •  ondansetron (ZOFRAN) 8 MG tablet, Take 1 tablet by mouth Every 8 (Eight) Hours As Needed for Nausea or Vomiting., Disp: 30 tablet, Rfl: 2  •  promethazine (PHENERGAN) 12.5 MG tablet, Take 1 tablet by mouth Every 6 (Six) Hours As Needed for Nausea or Vomiting., Disp: 30 tablet, Rfl: 2  •  raNITIdine (ZANTAC) 150 MG tablet, Take  150 mg by mouth Daily., Disp: , Rfl:   •  sennosides-docusate sodium (SENOKOT-S) 8.6-50 MG tablet, Take 2 tablets by mouth Every Night., Disp: 60 tablet, Rfl: 0    No Known Allergies    Family History   Problem Relation Age of Onset   • Lung cancer Father 70       Cancer-related family history includes Lung cancer (age of onset: 70) in his father.    Social History     Tobacco Use   • Smoking status: Former Smoker     Packs/day: 1.00     Types: Cigarettes     Start date:      Last attempt to quit: 2019     Years since quittin.6   • Smokeless tobacco: Never Used   Substance Use Topics   • Alcohol use: No     Frequency: Never   • Drug use: Yes     Types: Marijuana     Comment: denies use for years       I have reviewed the history of present illness, past medical history, family history, social history, lab results, all notes and other records since the patient was last seen on 19.     SUBJECTIVE: Patient is here for follow up of lung cancer with brain mets.     CHALO Sevilla present during office visit.        REVIEW OF SYSTEMS:  Review of Systems   Constitutional: Negative for chills, diaphoresis, fatigue, fever and unexpected weight change.   HENT: Negative for congestion, ear pain, mouth sores, nosebleeds and sore throat.    Eyes: Negative.  Negative for photophobia and visual disturbance.   Respiratory: Negative for wheezing and stridor.    Cardiovascular: Negative for chest pain, palpitations and leg swelling.   Gastrointestinal: Negative for abdominal pain, blood in stool, constipation, diarrhea and vomiting.   Endocrine: Negative for cold intolerance and heat intolerance.   Genitourinary: Negative for dysuria and hematuria.   Musculoskeletal: Negative for joint swelling and neck stiffness.   Skin: Negative for color change and wound.   Allergic/Immunologic: Negative.    Neurological: Negative for seizures, syncope, numbness and headaches.   Hematological: Negative for adenopathy. Does not  bruise/bleed easily.        No obvious bleeding   Psychiatric/Behavioral: Negative for agitation, confusion and hallucinations.   All other systems reviewed and are negative.      OBJECTIVE:    There were no vitals filed for this visit.    ECOG  (2) Ambulatory and capable of self care, unable to carry out work activity, up and about > 50% or waking hours    Physical Exam   HENT:   Poor dentition.   Neck: No thyromegaly present.   Cardiovascular:   No murmur heard.  Pulmonary/Chest:   Right chest wall port.    Abdominal: He exhibits no distension.   Musculoskeletal: He exhibits no edema.   Lymphadenopathy:     He has no cervical adenopathy.     He has no axillary adenopathy.        Right: No supraclavicular adenopathy present.        Left: No supraclavicular adenopathy present.   Skin: No bruising and no petechiae noted.       RECENT LABS  WBC   Date Value Ref Range Status   08/22/2019 16.10 (H) 3.40 - 10.80 10*3/mm3 Final     RBC   Date Value Ref Range Status   08/22/2019 4.27 4.14 - 5.80 10*6/mm3 Final     Hemoglobin   Date Value Ref Range Status   08/22/2019 12.3 (L) 13.0 - 17.7 g/dL Final     Hematocrit   Date Value Ref Range Status   08/22/2019 38.5 37.5 - 51.0 % Final     MCV   Date Value Ref Range Status   08/22/2019 90.2 79.0 - 97.0 fL Final     MCH   Date Value Ref Range Status   08/22/2019 28.8 26.6 - 33.0 pg Final     MCHC   Date Value Ref Range Status   08/22/2019 31.9 31.5 - 35.7 g/dL Final     RDW   Date Value Ref Range Status   08/22/2019 15.6 (H) 12.3 - 15.4 % Final     RDW-SD   Date Value Ref Range Status   08/22/2019 49.7 37.0 - 54.0 fl Final     MPV   Date Value Ref Range Status   08/22/2019 7.9 6.0 - 12.0 fL Final     Platelets   Date Value Ref Range Status   08/22/2019 209 140 - 450 10*3/mm3 Final     Neutrophil %   Date Value Ref Range Status   08/22/2019 94.8 (H) 42.7 - 76.0 % Final     Lymphocyte %   Date Value Ref Range Status   08/22/2019 2.4 (L) 19.6 - 45.3 % Final     Monocyte %   Date  Value Ref Range Status   08/22/2019 2.4 (L) 5.0 - 12.0 % Final     Eosinophil %   Date Value Ref Range Status   08/22/2019 0.0 (L) 0.3 - 6.2 % Final     Basophil %   Date Value Ref Range Status   08/22/2019 0.4 0.0 - 1.5 % Final     Immature Grans %   Date Value Ref Range Status   07/08/2019 2.0 (H) 0.0 - 0.5 % Final     Neutrophils, Absolute   Date Value Ref Range Status   08/22/2019 15.28 (H) 1.70 - 7.00 10*3/mm3 Final     Lymphocytes, Absolute   Date Value Ref Range Status   08/22/2019 0.38 (L) 0.70 - 3.10 10*3/mm3 Final     Monocytes, Absolute   Date Value Ref Range Status   08/22/2019 0.38 0.10 - 0.90 10*3/mm3 Final     Eosinophils, Absolute   Date Value Ref Range Status   08/22/2019 0.00 0.00 - 0.40 10*3/mm3 Final     Basophils, Absolute   Date Value Ref Range Status   08/22/2019 0.06 0.00 - 0.20 10*3/mm3 Final     Immature Grans, Absolute   Date Value Ref Range Status   07/08/2019 0.29 (H) 0.00 - 0.05 10*3/mm3 Final     nRBC   Date Value Ref Range Status   07/08/2019 0.0 0.0 - 0.2 /100 WBC Final       Lab Results   Component Value Date    GLUCOSE 327 (H) 08/22/2019    BUN 16 08/22/2019    CREATININE 0.60 (L) 08/22/2019    EGFRIFNONA 138 08/22/2019    BCR 26.7 (H) 08/22/2019    K 5.3 (H) 08/22/2019    CO2 28.0 08/22/2019    CALCIUM 9.1 08/22/2019    ALBUMIN 2.80 (L) 08/22/2019    AST 14 (L) 08/22/2019    ALT 16 (L) 08/22/2019         Assessment/Plan     There are no diagnoses linked to this encounter.    ASSESSMENT:      PLAN:  Continue chemotherapy.       I have reviewed lab results, imaging, vitals, and medications with the patient today. Will follow up in .     Patient verbalized understanding and is in agreement of the above plan.    I have reviewed and agree with the above information.   Edson Victor M.D., F.A.C.P.    Much of the above report is an electronic transcription/translation of the spoken language to printed text using Dragon Software. As such, the subtleties and finesse of the spoken  language may permit erroneous, or at times, nonsensical words or phrases to be inadvertently transcribed; thus changes may be made at a later date to rectify these errors.

## 2019-09-10 NOTE — TELEPHONE ENCOUNTER
Received vm from pt's daughter in law stating that pt has decided he wants to stop treatment.  Chart reviewed and this was discussed with pt at his last office visit and pt was to make a decision.  She is wanting to know if he needs to keep his follow up appt for this afternoon.  Spoke w/ Dania Fuchs MA, who states that they discussed this at his last visit and he is okay to cancel his visit today as long as he is not going to need our assistance with medications, etc.  Spoke w/ pt's son, Edmundo and informed him of this.  He states that pt is with hospice and they are providing medications.  He requests that I cancel today's appt.  Appointment cancelled and Dania notified and v/u.

## (undated) DEVICE — SEAL DURL ADHERUS/AUTOSPRAY HYDROGEL DS

## (undated) DEVICE — NDL HYPO PRECISIONGLIDE REG 20G 1 1/2

## (undated) DEVICE — SOL IRR PHYSIOSOL BTL 1000ML

## (undated) DEVICE — DISPOSABLE TUBING SET AND EXTENDER FILTER TUBING

## (undated) DEVICE — 3M™ STERI-STRIP™ ANTIMICROBIAL SKIN CLOSURES 1/2 INCH X 4 INCHES 50/CARTON 4 CARTONS/CASE A1847: Brand: 3M™ STERI-STRIP™

## (undated) DEVICE — STRAIGHT TIP, UNIVERSAL

## (undated) DEVICE — 2.3MM TAPERED ROUTER

## (undated) DEVICE — SPHR MARKR STEALTH STATION

## (undated) DEVICE — CVR PROB 96IN LF STRL

## (undated) DEVICE — GLV SURG BIOGEL LTX PF 7

## (undated) DEVICE — GLV SURG SENSICARE W/ALOE PF LF 7.5 STRL

## (undated) DEVICE — DISPOSABLE IRRIGATION BIPOLAR CORD, M1000 TYPE: Brand: KIRWAN

## (undated) DEVICE — DURAHOOK 1/4HOOK PK/6

## (undated) DEVICE — ANTIBACTERIAL UNDYED BRAIDED (POLYGLACTIN 910), SYNTHETIC ABSORBABLE SUTURE: Brand: COATED VICRYL

## (undated) DEVICE — Device

## (undated) DEVICE — DRSNG TELFA PAD NONADH STR 1S 3X8IN

## (undated) DEVICE — DRP MICROSCOPE 4 BINOCULAR CV 54X150IN

## (undated) DEVICE — MARKR SKIN W/RULR AND LBL

## (undated) DEVICE — CRANIALMASK TRACKER: Brand: CRANIALMASK TRACKER

## (undated) DEVICE — SMOKE EVACUATION TUBING WITH 7/8 IN TO 1/4 IN REDUCER: Brand: BUFFALO FILTER

## (undated) DEVICE — CONN TBG Y 5 IN 1 LF STRL

## (undated) DEVICE — DRSNG WND GZ PAD BORDERED 4X8IN STRL

## (undated) DEVICE — CONTAINER,SPECIMEN,OR STERILE,4OZ: Brand: MEDLINE

## (undated) DEVICE — SUT NUROLON 4/0 TF18 CR8 I8IN C584D

## (undated) DEVICE — INSTRUMENT BATTERY

## (undated) DEVICE — MAYFIELD® DISPOSABLE ADULT SKULL PIN (PLASTIC BASE): Brand: MAYFIELD®

## (undated) DEVICE — APPL CHLORAPREP W/TINT 10.5ML PERC STRL

## (undated) DEVICE — PERFORATOR CDM WO/ DURAGUARD 14/11

## (undated) DEVICE — ADHS SKIN DERMABOND TOP ADVANCED

## (undated) DEVICE — TUBING, SUCTION, 1/4" X 20', STRAIGHT: Brand: MEDLINE INDUSTRIES, INC.

## (undated) DEVICE — PK CRANI 40